# Patient Record
Sex: FEMALE | Race: WHITE | NOT HISPANIC OR LATINO | Employment: UNEMPLOYED | URBAN - METROPOLITAN AREA
[De-identification: names, ages, dates, MRNs, and addresses within clinical notes are randomized per-mention and may not be internally consistent; named-entity substitution may affect disease eponyms.]

---

## 2021-03-11 PROBLEM — O30.001 TWIN GESTATION IN FIRST TRIMESTER, UNSPECIFIED MULTIPLE GESTATION TYPE: Status: RESOLVED | Noted: 2021-03-11 | Resolved: 2021-03-11

## 2021-03-11 PROBLEM — O30.001: Status: ACTIVE | Noted: 2021-03-11

## 2021-03-11 PROBLEM — O30.001 TWIN GESTATION IN FIRST TRIMESTER, UNSPECIFIED MULTIPLE GESTATION TYPE: Status: ACTIVE | Noted: 2021-03-11

## 2021-03-11 PROBLEM — O30.032 MONOCHORIONIC DIAMNIOTIC TWIN GESTATION IN SECOND TRIMESTER (HCC): Status: ACTIVE | Noted: 2021-03-11

## 2021-03-11 PROBLEM — O30.032 MONOCHORIONIC DIAMNIOTIC TWIN GESTATION IN SECOND TRIMESTER: Status: ACTIVE | Noted: 2021-03-11

## 2021-03-11 PROBLEM — O30.001: Status: RESOLVED | Noted: 2021-03-11 | Resolved: 2021-03-11

## 2021-03-11 PROBLEM — O31.22X1: Status: ACTIVE | Noted: 2021-03-11

## 2021-04-12 ENCOUNTER — HOSPITAL ENCOUNTER (OUTPATIENT)
Dept: MRI IMAGING | Age: 31
Discharge: HOME | End: 2021-04-12
Attending: OBSTETRICS & GYNECOLOGY

## 2021-04-12 DIAGNOSIS — O31.22X1 TWIN PREGNANCY WITH SINGLE INTRAUTERINE DEATH IN SECOND TRIMESTER, FETUS 1 OF MULTIPLE GESTATION: ICD-10-CM

## 2021-04-12 PROCEDURE — 74713 MRI FETAL EA ADDL GESTATION: CPT

## 2021-04-12 PROCEDURE — 74712 MRI FETAL SNGL/1ST GESTATION: CPT

## 2021-07-30 ENCOUNTER — TELEPHONE (OUTPATIENT)
Dept: OBGYN UNIT | Facility: HOSPITAL | Age: 31
End: 2021-07-30

## 2021-08-03 ENCOUNTER — TELEPHONE (OUTPATIENT)
Dept: OBGYN UNIT | Facility: HOSPITAL | Age: 31
End: 2021-08-03

## 2021-08-05 ENCOUNTER — APPOINTMENT (OUTPATIENT)
Dept: OBGYN CLINIC | Facility: HOSPITAL | Age: 31
End: 2021-08-05
Payer: COMMERCIAL

## 2021-08-05 ENCOUNTER — HOSPITAL ENCOUNTER (INPATIENT)
Facility: HOSPITAL | Age: 31
LOS: 2 days | Discharge: HOME | End: 2021-08-07
Attending: OBSTETRICS & GYNECOLOGY | Admitting: OBSTETRICS & GYNECOLOGY
Payer: COMMERCIAL

## 2021-08-05 ENCOUNTER — ANESTHESIA (OUTPATIENT)
Dept: OBGYN UNIT | Facility: HOSPITAL | Age: 31
End: 2021-08-05
Payer: COMMERCIAL

## 2021-08-05 ENCOUNTER — ANESTHESIA EVENT (OUTPATIENT)
Dept: OBGYN UNIT | Facility: HOSPITAL | Age: 31
End: 2021-08-05
Payer: COMMERCIAL

## 2021-08-05 PROBLEM — Z34.90 PREGNANCY: Status: ACTIVE | Noted: 2021-08-05

## 2021-08-05 PROBLEM — Z34.90 PREGNANCY (HCC): Status: ACTIVE | Noted: 2021-08-05

## 2021-08-05 LAB
ANTIBODY SCREEN: NEGATIVE
BASOPHILS # BLD AUTO: 0.02 X10(3) UL (ref 0–0.2)
BASOPHILS NFR BLD AUTO: 0.5 %
EOSINOPHIL # BLD AUTO: 0.09 X10(3) UL (ref 0–0.7)
EOSINOPHIL NFR BLD AUTO: 2.1 %
ERYTHROCYTE [DISTWIDTH] IN BLOOD BY AUTOMATED COUNT: 17.1 %
HCT VFR BLD AUTO: 36 %
HGB BLD-MCNC: 11.8 G/DL
IMM GRANULOCYTES # BLD AUTO: 0.01 X10(3) UL (ref 0–1)
IMM GRANULOCYTES NFR BLD: 0.2 %
LYMPHOCYTES # BLD AUTO: 1.2 X10(3) UL (ref 1–4)
LYMPHOCYTES NFR BLD AUTO: 28.1 %
MCH RBC QN AUTO: 28.4 PG (ref 26–34)
MCHC RBC AUTO-ENTMCNC: 32.8 G/DL (ref 31–37)
MCV RBC AUTO: 86.7 FL
MONOCYTES # BLD AUTO: 0.46 X10(3) UL (ref 0.1–1)
MONOCYTES NFR BLD AUTO: 10.8 %
NEUTROPHILS # BLD AUTO: 2.49 X10 (3) UL (ref 1.5–7.7)
NEUTROPHILS # BLD AUTO: 2.49 X10(3) UL (ref 1.5–7.7)
NEUTROPHILS NFR BLD AUTO: 58.3 %
PLATELET # BLD AUTO: 181 10(3)UL (ref 150–450)
RBC # BLD AUTO: 4.15 X10(6)UL
RH BLOOD TYPE: POSITIVE
T PALLIDUM AB SER QL IA: NONREACTIVE
WBC # BLD AUTO: 4.3 X10(3) UL (ref 4–11)

## 2021-08-05 PROCEDURE — 3E033VJ INTRODUCTION OF OTHER HORMONE INTO PERIPHERAL VEIN, PERCUTANEOUS APPROACH: ICD-10-PCS | Performed by: OBSTETRICS & GYNECOLOGY

## 2021-08-05 PROCEDURE — 86850 RBC ANTIBODY SCREEN: CPT | Performed by: OBSTETRICS & GYNECOLOGY

## 2021-08-05 PROCEDURE — 86900 BLOOD TYPING SEROLOGIC ABO: CPT | Performed by: OBSTETRICS & GYNECOLOGY

## 2021-08-05 PROCEDURE — 85025 COMPLETE CBC W/AUTO DIFF WBC: CPT | Performed by: OBSTETRICS & GYNECOLOGY

## 2021-08-05 PROCEDURE — 10907ZC DRAINAGE OF AMNIOTIC FLUID, THERAPEUTIC FROM PRODUCTS OF CONCEPTION, VIA NATURAL OR ARTIFICIAL OPENING: ICD-10-PCS | Performed by: OBSTETRICS & GYNECOLOGY

## 2021-08-05 PROCEDURE — 86901 BLOOD TYPING SEROLOGIC RH(D): CPT | Performed by: OBSTETRICS & GYNECOLOGY

## 2021-08-05 PROCEDURE — 88309 TISSUE EXAM BY PATHOLOGIST: CPT | Performed by: OBSTETRICS & GYNECOLOGY

## 2021-08-05 PROCEDURE — 0HQ9XZZ REPAIR PERINEUM SKIN, EXTERNAL APPROACH: ICD-10-PCS | Performed by: OBSTETRICS & GYNECOLOGY

## 2021-08-05 PROCEDURE — 86780 TREPONEMA PALLIDUM: CPT | Performed by: OBSTETRICS & GYNECOLOGY

## 2021-08-05 PROCEDURE — 88307 TISSUE EXAM BY PATHOLOGIST: CPT | Performed by: OBSTETRICS & GYNECOLOGY

## 2021-08-05 RX ORDER — ACETAMINOPHEN 325 MG/1
650 TABLET ORAL EVERY 6 HOURS PRN
Status: DISCONTINUED | OUTPATIENT
Start: 2021-08-05 | End: 2021-08-07

## 2021-08-05 RX ORDER — DOCUSATE SODIUM 100 MG/1
100 CAPSULE, LIQUID FILLED ORAL 2 TIMES DAILY
Status: DISCONTINUED | OUTPATIENT
Start: 2021-08-05 | End: 2021-08-07

## 2021-08-05 RX ORDER — NALBUPHINE HCL 10 MG/ML
2.5 AMPUL (ML) INJECTION
Status: DISCONTINUED | OUTPATIENT
Start: 2021-08-05 | End: 2021-08-05

## 2021-08-05 RX ORDER — IBUPROFEN 600 MG/1
600 TABLET ORAL EVERY 6 HOURS
Status: DISCONTINUED | OUTPATIENT
Start: 2021-08-05 | End: 2021-08-07

## 2021-08-05 RX ORDER — TERBUTALINE SULFATE 1 MG/ML
0.25 INJECTION, SOLUTION SUBCUTANEOUS AS NEEDED
Status: DISCONTINUED | OUTPATIENT
Start: 2021-08-05 | End: 2021-08-05

## 2021-08-05 RX ORDER — BUPIVACAINE HYDROCHLORIDE 2.5 MG/ML
INJECTION, SOLUTION EPIDURAL; INFILTRATION; INTRACAUDAL AS NEEDED
Status: DISCONTINUED | OUTPATIENT
Start: 2021-08-05 | End: 2021-08-05 | Stop reason: SURG

## 2021-08-05 RX ORDER — SIMETHICONE 80 MG
80 TABLET,CHEWABLE ORAL 3 TIMES DAILY PRN
Status: DISCONTINUED | OUTPATIENT
Start: 2021-08-05 | End: 2021-08-07

## 2021-08-05 RX ORDER — TRISODIUM CITRATE DIHYDRATE AND CITRIC ACID MONOHYDRATE 500; 334 MG/5ML; MG/5ML
30 SOLUTION ORAL AS NEEDED
Status: DISCONTINUED | OUTPATIENT
Start: 2021-08-05 | End: 2021-08-05

## 2021-08-05 RX ORDER — BISACODYL 10 MG
10 SUPPOSITORY, RECTAL RECTAL ONCE AS NEEDED
Status: DISCONTINUED | OUTPATIENT
Start: 2021-08-05 | End: 2021-08-07

## 2021-08-05 RX ORDER — BUPIVACAINE HCL/0.9 % NACL/PF 0.25 %
5 PLASTIC BAG, INJECTION (ML) EPIDURAL AS NEEDED
Status: DISCONTINUED | OUTPATIENT
Start: 2021-08-05 | End: 2021-08-05

## 2021-08-05 RX ORDER — DEXTROSE, SODIUM CHLORIDE, SODIUM LACTATE, POTASSIUM CHLORIDE, AND CALCIUM CHLORIDE 5; .6; .31; .03; .02 G/100ML; G/100ML; G/100ML; G/100ML; G/100ML
INJECTION, SOLUTION INTRAVENOUS AS NEEDED
Status: DISCONTINUED | OUTPATIENT
Start: 2021-08-05 | End: 2021-08-05

## 2021-08-05 RX ORDER — IBUPROFEN 600 MG/1
600 TABLET ORAL EVERY 6 HOURS PRN
Status: DISCONTINUED | OUTPATIENT
Start: 2021-08-05 | End: 2021-08-05

## 2021-08-05 RX ORDER — ACETAMINOPHEN 500 MG
500 TABLET ORAL EVERY 6 HOURS PRN
Status: DISCONTINUED | OUTPATIENT
Start: 2021-08-05 | End: 2021-08-05

## 2021-08-05 RX ORDER — ONDANSETRON 2 MG/ML
4 INJECTION INTRAMUSCULAR; INTRAVENOUS EVERY 6 HOURS PRN
Status: DISCONTINUED | OUTPATIENT
Start: 2021-08-05 | End: 2021-08-05

## 2021-08-05 RX ORDER — AMMONIA INHALANTS 0.04 G/.3ML
0.3 INHALANT RESPIRATORY (INHALATION) AS NEEDED
Status: DISCONTINUED | OUTPATIENT
Start: 2021-08-05 | End: 2021-08-05

## 2021-08-05 RX ORDER — SODIUM CHLORIDE, SODIUM LACTATE, POTASSIUM CHLORIDE, CALCIUM CHLORIDE 600; 310; 30; 20 MG/100ML; MG/100ML; MG/100ML; MG/100ML
INJECTION, SOLUTION INTRAVENOUS CONTINUOUS
Status: DISCONTINUED | OUTPATIENT
Start: 2021-08-05 | End: 2021-08-05

## 2021-08-05 RX ADMIN — BUPIVACAINE HYDROCHLORIDE 6 ML: 2.5 INJECTION, SOLUTION EPIDURAL; INFILTRATION; INTRACAUDAL at 13:48:00

## 2021-08-05 NOTE — PROGRESS NOTES
Called to room for something in the bed. Uncovered patient, twin B laying in bed, no obvious signs of life. Appears cream colored, can make out 4 limbs, head and body. Patient and  both looked at baby. Desires baby to be cremated.      SVE: complete/

## 2021-08-05 NOTE — ANESTHESIA PREPROCEDURE EVALUATION
PRE-OP EVALUATION    Patient Name: Shon Austin    Admit Diagnosis: Pregnancy    Pre-op Diagnosis: * No pre-op diagnosis entered *        Anesthesia Procedure: LABOR ANALGESIA    * No surgeons found in log *    Pre-op vitals reviewed.   Temp: 97.8 °F (36 GI/Hepatic/Renal                                 Cardiovascular        Exercise tolerance: good     MET: >4                                           Endo/Other               (+) anemia                   Pulmonary                 (-) recent URI          Ne

## 2021-08-05 NOTE — PROGRESS NOTES
Pt is a 27year old female admitted to 114/114-A, Patient presents with:  Scheduled Induction     Pt is 37w4d intra-uterine pregnancy. Denies any leaking of fluid. Reports +fetal movement. History obtained, consents signed.  Oriented to room, staff, and p

## 2021-08-05 NOTE — PLAN OF CARE
Problem: BIRTH - VAGINAL/ SECTION  Goal: Fetal and maternal status remain reassuring during the birth process  Description: INTERVENTIONS:  - Monitor vital signs  - Monitor fetal heart rate  - Monitor uterine activity  - Monitor labor progression by Reddy Gaming, RN  Outcome: Completed  8/5/2021 0907 by Reddy Gaming, RN  Outcome: Progressing  Goal: Patient/Family Short Term Goal  Description: Patient's Short Term Goal: Adequate pain relief    Interventions:     - See additional Care P

## 2021-08-05 NOTE — L&D DELIVERY NOTE
Dane Lubin, Girl [[id number]]    Labor Events     labor?: No  Rupture date/time: 2021 1324     Rupture type: AROM  Fluid color: Clear  Intrapartum & labor complications comment: Twin B passed away at 15 wks     Labor Event Times    Labor onset date/ti Vaginal Count    No data filed      Delivery (Maternal)    No data filed           Delivery Summary    Patient was complete and pushing for 15 minutes. Head delivered spontaneously. She then delivered a viable baby girl via  without difficulty.  The

## 2021-08-05 NOTE — H&P
[address] [facility] Patient Status:  Inpatient    1990 MRN [id number]   Location [address] [facility] Attending Deann Carvajal, [address] Day # 0 PCP No primary care provider on file.      SUBJECTIVE: 12:15 PM

## 2021-08-05 NOTE — ANESTHESIA PROCEDURE NOTES
Labor Analgesia  Performed by: Torie Greco MD  Authorized by: Torie Greco MD       General Information and Staff    Start Time:  8/5/2021 1:39 PM  End Time:  8/5/2021 1:48 PM  Anesthesiologist:  Torie Greco MD  Performed by:   Anesthesiologist

## 2021-08-05 NOTE — PROGRESS NOTES
Report given to mother baby RN, Infant and pt transferred to mother baby in stable condition accompanied by RN and .

## 2021-08-05 NOTE — PROGRESS NOTES
Feeling contractions, not painful yet  FHT: cat 1  [place]: every 2-3 min    /56   Pulse 87   Temp 97.8 °F (36.6 °C) (Axillary)   Resp 14   LMP 11/15/2020   SVE: 4/70-2  Hand no longer palpated during cervical exam, head applied to cervix  AROM, clear f

## 2021-08-05 NOTE — PROGRESS NOTES
Met with patient and , Monet Bustos, shortly after delivery. Discussed mixed emotions of yo for surviving twin, Rhonda Barkley, and sadness for daughter Santino Jaime. Share resources reviewed, and follow up call for next week planned.

## 2021-08-06 PROBLEM — Z34.90 PREGNANCY (HCC): Status: RESOLVED | Noted: 2021-08-05 | Resolved: 2021-08-06

## 2021-08-06 PROBLEM — Z34.90 PREGNANCY: Status: RESOLVED | Noted: 2021-08-05 | Resolved: 2021-08-06

## 2021-08-06 LAB
BASOPHILS # BLD AUTO: 0.03 X10(3) UL (ref 0–0.2)
BASOPHILS NFR BLD AUTO: 0.5 %
EOSINOPHIL # BLD AUTO: 0.11 X10(3) UL (ref 0–0.7)
EOSINOPHIL NFR BLD AUTO: 1.9 %
ERYTHROCYTE [DISTWIDTH] IN BLOOD BY AUTOMATED COUNT: 17.2 %
HCT VFR BLD AUTO: 34.8 %
HGB BLD-MCNC: 11.4 G/DL
IMM GRANULOCYTES # BLD AUTO: 0.03 X10(3) UL (ref 0–1)
IMM GRANULOCYTES NFR BLD: 0.5 %
LYMPHOCYTES # BLD AUTO: 1.16 X10(3) UL (ref 1–4)
LYMPHOCYTES NFR BLD AUTO: 19.8 %
MCH RBC QN AUTO: 28.1 PG (ref 26–34)
MCHC RBC AUTO-ENTMCNC: 32.8 G/DL (ref 31–37)
MCV RBC AUTO: 85.7 FL
MONOCYTES # BLD AUTO: 0.58 X10(3) UL (ref 0.1–1)
MONOCYTES NFR BLD AUTO: 9.9 %
NEUTROPHILS # BLD AUTO: 3.94 X10 (3) UL (ref 1.5–7.7)
NEUTROPHILS # BLD AUTO: 3.94 X10(3) UL (ref 1.5–7.7)
NEUTROPHILS NFR BLD AUTO: 67.4 %
PLATELET # BLD AUTO: 170 10(3)UL (ref 150–450)
RBC # BLD AUTO: 4.06 X10(6)UL
WBC # BLD AUTO: 5.9 X10(3) UL (ref 4–11)

## 2021-08-06 PROCEDURE — 85025 COMPLETE CBC W/AUTO DIFF WBC: CPT | Performed by: OBSTETRICS & GYNECOLOGY

## 2021-08-06 RX ORDER — IBUPROFEN 600 MG/1
600 TABLET ORAL EVERY 6 HOURS PRN
Qty: 60 TABLET | Refills: 0 | Status: SHIPPED | OUTPATIENT
Start: 2021-08-06 | End: 2021-10-12

## 2021-08-06 NOTE — PROGRESS NOTES
Labor Analgesia Follow Up Note    Patient underwent epidural anesthesia for labor analgesia,    Placenta Date/Time: 8/5/2021  4:06 PM    Delivery Date/Time[de-identified] 8/5/2021  4:04 PM    /61 (BP Location: Left arm)   Pulse 73   Temp 98.1 °F (36.7 °C) (Axilla

## 2021-08-07 VITALS
HEART RATE: 67 BPM | RESPIRATION RATE: 18 BRPM | TEMPERATURE: 98 F | DIASTOLIC BLOOD PRESSURE: 76 MMHG | SYSTOLIC BLOOD PRESSURE: 120 MMHG

## 2021-08-07 NOTE — PROGRESS NOTES
OB Progress Note PPD#2  S: Feels well. Ambulating, eating. Pain controlled. Minimal VB. Breastfeeding. O:   Blood pressure 120/76, pulse 67, temperature 97.5 °F (36.4 °C), temperature source Oral, resp.  rate 18, last menstrual period 11/15/2020, current

## 2021-08-09 ENCOUNTER — TELEPHONE (OUTPATIENT)
Dept: OBGYN UNIT | Facility: HOSPITAL | Age: 31
End: 2021-08-09

## 2021-08-09 NOTE — PROGRESS NOTES
Reviewed self and infant care w / mom, she verbalizes understanding of instructions reviewed. Encourage to follow up w/ MDs as directed and w/ questions/concerns. Given phone number of [facility] to discuss other twins pickup by  home, denies ppd or bb.

## 2021-08-12 ENCOUNTER — TELEPHONE (OUTPATIENT)
Dept: OBGYN UNIT | Facility: HOSPITAL | Age: 31
End: 2021-08-12

## 2022-10-25 ENCOUNTER — OFFICE VISIT (OUTPATIENT)
Dept: FAMILY MEDICINE CLINIC | Facility: CLINIC | Age: 32
End: 2022-10-25
Payer: COMMERCIAL

## 2022-10-25 VITALS
BODY MASS INDEX: 19.53 KG/M2 | TEMPERATURE: 98 F | HEART RATE: 72 BPM | DIASTOLIC BLOOD PRESSURE: 64 MMHG | SYSTOLIC BLOOD PRESSURE: 102 MMHG | WEIGHT: 127.38 LBS | HEIGHT: 67.75 IN | RESPIRATION RATE: 16 BRPM

## 2022-10-25 DIAGNOSIS — Z00.00 WELL WOMAN EXAM WITHOUT GYNECOLOGICAL EXAM: Primary | ICD-10-CM

## 2022-10-25 PROBLEM — O31.22X1: Status: RESOLVED | Noted: 2021-03-11 | Resolved: 2022-10-25

## 2022-10-25 PROBLEM — O30.032 MONOCHORIONIC DIAMNIOTIC TWIN GESTATION IN SECOND TRIMESTER: Status: RESOLVED | Noted: 2021-03-11 | Resolved: 2022-10-25

## 2022-10-25 PROBLEM — O30.032 MONOCHORIONIC DIAMNIOTIC TWIN GESTATION IN SECOND TRIMESTER (HCC): Status: RESOLVED | Noted: 2021-03-11 | Resolved: 2022-10-25

## 2022-10-25 PROCEDURE — 99385 PREV VISIT NEW AGE 18-39: CPT | Performed by: FAMILY MEDICINE

## 2022-10-25 PROCEDURE — 3078F DIAST BP <80 MM HG: CPT | Performed by: FAMILY MEDICINE

## 2022-10-25 PROCEDURE — 3008F BODY MASS INDEX DOCD: CPT | Performed by: FAMILY MEDICINE

## 2022-10-25 PROCEDURE — 3074F SYST BP LT 130 MM HG: CPT | Performed by: FAMILY MEDICINE

## 2022-10-27 ENCOUNTER — LAB ENCOUNTER (OUTPATIENT)
Dept: LAB | Facility: HOSPITAL | Age: 32
End: 2022-10-27
Attending: FAMILY MEDICINE
Payer: COMMERCIAL

## 2022-10-27 DIAGNOSIS — Z00.00 WELL WOMAN EXAM WITHOUT GYNECOLOGICAL EXAM: ICD-10-CM

## 2022-10-27 LAB
ALBUMIN SERPL-MCNC: 4.2 G/DL (ref 3.4–5)
ALBUMIN/GLOB SERPL: 1.1 {RATIO} (ref 1–2)
ALP LIVER SERPL-CCNC: 44 U/L
ALT SERPL-CCNC: 20 U/L
ANION GAP SERPL CALC-SCNC: 4 MMOL/L (ref 0–18)
AST SERPL-CCNC: 12 U/L (ref 15–37)
BASOPHILS # BLD AUTO: 0.03 X10(3) UL (ref 0–0.2)
BASOPHILS NFR BLD AUTO: 0.8 %
BILIRUB SERPL-MCNC: 1 MG/DL (ref 0.1–2)
BUN BLD-MCNC: 14 MG/DL (ref 7–18)
CALCIUM BLD-MCNC: 8.6 MG/DL (ref 8.5–10.1)
CHLORIDE SERPL-SCNC: 107 MMOL/L (ref 98–112)
CHOLEST SERPL-MCNC: 203 MG/DL (ref ?–200)
CO2 SERPL-SCNC: 25 MMOL/L (ref 21–32)
CREAT BLD-MCNC: 0.67 MG/DL
EOSINOPHIL # BLD AUTO: 0.06 X10(3) UL (ref 0–0.7)
EOSINOPHIL NFR BLD AUTO: 1.6 %
ERYTHROCYTE [DISTWIDTH] IN BLOOD BY AUTOMATED COUNT: 13.2 %
FASTING PATIENT LIPID ANSWER: YES
FASTING STATUS PATIENT QL REPORTED: YES
GFR SERPLBLD BASED ON 1.73 SQ M-ARVRAT: 118 ML/MIN/1.73M2 (ref 60–?)
GLOBULIN PLAS-MCNC: 3.7 G/DL (ref 2.8–4.4)
GLUCOSE BLD-MCNC: 85 MG/DL (ref 70–99)
HCT VFR BLD AUTO: 40.7 %
HDLC SERPL-MCNC: 79 MG/DL (ref 40–59)
HGB BLD-MCNC: 13.4 G/DL
IMM GRANULOCYTES # BLD AUTO: 0.01 X10(3) UL (ref 0–1)
IMM GRANULOCYTES NFR BLD: 0.3 %
LDLC SERPL CALC-MCNC: 114 MG/DL (ref ?–100)
LYMPHOCYTES # BLD AUTO: 1.23 X10(3) UL (ref 1–4)
LYMPHOCYTES NFR BLD AUTO: 32.5 %
MCH RBC QN AUTO: 29.1 PG (ref 26–34)
MCHC RBC AUTO-ENTMCNC: 32.9 G/DL (ref 31–37)
MCV RBC AUTO: 88.5 FL
MONOCYTES # BLD AUTO: 0.39 X10(3) UL (ref 0.1–1)
MONOCYTES NFR BLD AUTO: 10.3 %
NEUTROPHILS # BLD AUTO: 2.07 X10 (3) UL (ref 1.5–7.7)
NEUTROPHILS # BLD AUTO: 2.07 X10(3) UL (ref 1.5–7.7)
NEUTROPHILS NFR BLD AUTO: 54.5 %
NONHDLC SERPL-MCNC: 124 MG/DL (ref ?–130)
OSMOLALITY SERPL CALC.SUM OF ELEC: 282 MOSM/KG (ref 275–295)
PLATELET # BLD AUTO: 242 10(3)UL (ref 150–450)
POTASSIUM SERPL-SCNC: 4.3 MMOL/L (ref 3.5–5.1)
PROT SERPL-MCNC: 7.9 G/DL (ref 6.4–8.2)
RBC # BLD AUTO: 4.6 X10(6)UL
SODIUM SERPL-SCNC: 136 MMOL/L (ref 136–145)
TRIGL SERPL-MCNC: 56 MG/DL (ref 30–149)
TSI SER-ACNC: 3.07 MIU/ML (ref 0.36–3.74)
VLDLC SERPL CALC-MCNC: 10 MG/DL (ref 0–30)
WBC # BLD AUTO: 3.8 X10(3) UL (ref 4–11)

## 2022-10-27 PROCEDURE — 84443 ASSAY THYROID STIM HORMONE: CPT

## 2022-10-27 PROCEDURE — 36415 COLL VENOUS BLD VENIPUNCTURE: CPT

## 2022-10-27 PROCEDURE — 80061 LIPID PANEL: CPT

## 2022-10-27 PROCEDURE — 80053 COMPREHEN METABOLIC PANEL: CPT

## 2022-10-27 PROCEDURE — 85025 COMPLETE CBC W/AUTO DIFF WBC: CPT

## 2022-10-28 ENCOUNTER — PATIENT MESSAGE (OUTPATIENT)
Dept: FAMILY MEDICINE CLINIC | Facility: CLINIC | Age: 32
End: 2022-10-28

## 2022-10-28 DIAGNOSIS — E78.5 HYPERLIPIDEMIA, UNSPECIFIED HYPERLIPIDEMIA TYPE: ICD-10-CM

## 2022-10-28 DIAGNOSIS — D72.819 LEUKOPENIA, UNSPECIFIED TYPE: Primary | ICD-10-CM

## 2022-12-06 ENCOUNTER — LAB ENCOUNTER (OUTPATIENT)
Dept: LAB | Facility: HOSPITAL | Age: 32
End: 2022-12-06
Attending: FAMILY MEDICINE
Payer: COMMERCIAL

## 2022-12-06 ENCOUNTER — PATIENT MESSAGE (OUTPATIENT)
Dept: FAMILY MEDICINE CLINIC | Facility: CLINIC | Age: 32
End: 2022-12-06

## 2022-12-06 DIAGNOSIS — E78.5 HYPERLIPIDEMIA, UNSPECIFIED HYPERLIPIDEMIA TYPE: ICD-10-CM

## 2022-12-06 DIAGNOSIS — D72.819 LEUKOPENIA, UNSPECIFIED TYPE: ICD-10-CM

## 2022-12-06 LAB
BASOPHILS # BLD AUTO: 0.04 X10(3) UL (ref 0–0.2)
BASOPHILS NFR BLD AUTO: 1 %
CHOLEST SERPL-MCNC: 185 MG/DL (ref ?–200)
EOSINOPHIL # BLD AUTO: 0.12 X10(3) UL (ref 0–0.7)
EOSINOPHIL NFR BLD AUTO: 3.1 %
ERYTHROCYTE [DISTWIDTH] IN BLOOD BY AUTOMATED COUNT: 13.2 %
FASTING PATIENT LIPID ANSWER: YES
HCT VFR BLD AUTO: 40.6 %
HDLC SERPL-MCNC: 75 MG/DL (ref 40–59)
HGB BLD-MCNC: 13 G/DL
IMM GRANULOCYTES # BLD AUTO: 0.01 X10(3) UL (ref 0–1)
IMM GRANULOCYTES NFR BLD: 0.3 %
LDLC SERPL CALC-MCNC: 99 MG/DL (ref ?–100)
LYMPHOCYTES # BLD AUTO: 1.94 X10(3) UL (ref 1–4)
LYMPHOCYTES NFR BLD AUTO: 50.5 %
MCH RBC QN AUTO: 28.4 PG (ref 26–34)
MCHC RBC AUTO-ENTMCNC: 32 G/DL (ref 31–37)
MCV RBC AUTO: 88.8 FL
MONOCYTES # BLD AUTO: 0.4 X10(3) UL (ref 0.1–1)
MONOCYTES NFR BLD AUTO: 10.4 %
NEUTROPHILS # BLD AUTO: 1.33 X10 (3) UL (ref 1.5–7.7)
NEUTROPHILS # BLD AUTO: 1.33 X10(3) UL (ref 1.5–7.7)
NEUTROPHILS NFR BLD AUTO: 34.7 %
NONHDLC SERPL-MCNC: 110 MG/DL (ref ?–130)
PLATELET # BLD AUTO: 283 10(3)UL (ref 150–450)
RBC # BLD AUTO: 4.57 X10(6)UL
TRIGL SERPL-MCNC: 59 MG/DL (ref 30–149)
VLDLC SERPL CALC-MCNC: 10 MG/DL (ref 0–30)
WBC # BLD AUTO: 3.8 X10(3) UL (ref 4–11)

## 2022-12-06 PROCEDURE — 85025 COMPLETE CBC W/AUTO DIFF WBC: CPT

## 2022-12-06 PROCEDURE — 36415 COLL VENOUS BLD VENIPUNCTURE: CPT

## 2022-12-06 PROCEDURE — 80061 LIPID PANEL: CPT

## 2022-12-09 ENCOUNTER — PATIENT MESSAGE (OUTPATIENT)
Dept: FAMILY MEDICINE CLINIC | Facility: CLINIC | Age: 32
End: 2022-12-09

## 2023-01-12 ENCOUNTER — LAB ENCOUNTER (OUTPATIENT)
Dept: LAB | Facility: HOSPITAL | Age: 33
End: 2023-01-12
Attending: FAMILY MEDICINE
Payer: COMMERCIAL

## 2023-01-12 DIAGNOSIS — D72.819 LEUKOPENIA, UNSPECIFIED TYPE: ICD-10-CM

## 2023-01-12 LAB
BASOPHILS # BLD AUTO: 0.03 X10(3) UL (ref 0–0.2)
BASOPHILS NFR BLD AUTO: 0.6 %
EOSINOPHIL # BLD AUTO: 0.04 X10(3) UL (ref 0–0.7)
EOSINOPHIL NFR BLD AUTO: 0.8 %
ERYTHROCYTE [DISTWIDTH] IN BLOOD BY AUTOMATED COUNT: 13.2 %
HCT VFR BLD AUTO: 42.8 %
HGB BLD-MCNC: 13.6 G/DL
IMM GRANULOCYTES # BLD AUTO: 0.02 X10(3) UL (ref 0–1)
IMM GRANULOCYTES NFR BLD: 0.4 %
LYMPHOCYTES # BLD AUTO: 1.12 X10(3) UL (ref 1–4)
LYMPHOCYTES NFR BLD AUTO: 22.3 %
MCH RBC QN AUTO: 27.8 PG (ref 26–34)
MCHC RBC AUTO-ENTMCNC: 31.8 G/DL (ref 31–37)
MCV RBC AUTO: 87.3 FL
MONOCYTES # BLD AUTO: 0.32 X10(3) UL (ref 0.1–1)
MONOCYTES NFR BLD AUTO: 6.4 %
NEUTROPHILS # BLD AUTO: 3.5 X10 (3) UL (ref 1.5–7.7)
NEUTROPHILS # BLD AUTO: 3.5 X10(3) UL (ref 1.5–7.7)
NEUTROPHILS NFR BLD AUTO: 69.5 %
PLATELET # BLD AUTO: 257 10(3)UL (ref 150–450)
RBC # BLD AUTO: 4.9 X10(6)UL
WBC # BLD AUTO: 5 X10(3) UL (ref 4–11)

## 2023-01-12 PROCEDURE — 36415 COLL VENOUS BLD VENIPUNCTURE: CPT

## 2023-01-12 PROCEDURE — 85025 COMPLETE CBC W/AUTO DIFF WBC: CPT

## 2023-02-11 NOTE — PROGRESS NOTES
NURSING ADMISSION NOTE    Patient admitted to postpartum unit in stable condition. Safety precautions initiated. Bed in low position. Call light in reach. Patient and family oriented to room and mother baby unit. Received pt from triage. Pt here with c/o Lt shoulder pain since 0130. Pt states she has a h/o gout in that shoulder and pain feels the same. Recent knee surgery on lt and h/o ablation for Afib. Denies Cp or SOB at this time. Pain with movement and palpation of shoulder. Denies trauma.  Dr. Luis Ambrose at  for eval.

## 2023-02-16 ENCOUNTER — OFFICE VISIT (OUTPATIENT)
Dept: FAMILY MEDICINE CLINIC | Facility: CLINIC | Age: 33
End: 2023-02-16
Payer: COMMERCIAL

## 2023-02-16 VITALS
SYSTOLIC BLOOD PRESSURE: 118 MMHG | OXYGEN SATURATION: 98 % | DIASTOLIC BLOOD PRESSURE: 70 MMHG | WEIGHT: 125 LBS | RESPIRATION RATE: 16 BRPM | TEMPERATURE: 97 F | HEART RATE: 88 BPM | HEIGHT: 69 IN | BODY MASS INDEX: 18.51 KG/M2

## 2023-02-16 DIAGNOSIS — R10.13 EPIGASTRIC PAIN: Primary | ICD-10-CM

## 2023-02-16 PROCEDURE — 3074F SYST BP LT 130 MM HG: CPT | Performed by: FAMILY MEDICINE

## 2023-02-16 PROCEDURE — 3078F DIAST BP <80 MM HG: CPT | Performed by: FAMILY MEDICINE

## 2023-02-16 PROCEDURE — 3008F BODY MASS INDEX DOCD: CPT | Performed by: FAMILY MEDICINE

## 2023-02-16 PROCEDURE — 99214 OFFICE O/P EST MOD 30 MIN: CPT | Performed by: FAMILY MEDICINE

## 2023-02-16 RX ORDER — SUCRALFATE 1 G/1
1 TABLET ORAL
Qty: 40 TABLET | Refills: 0 | Status: SHIPPED | OUTPATIENT
Start: 2023-02-16 | End: 2023-02-26

## 2023-02-16 RX ORDER — MULTIVIT-MIN/IRON/FOLIC ACID/K 18-600-40
CAPSULE ORAL
COMMUNITY

## 2023-02-16 RX ORDER — OMEPRAZOLE 20 MG/1
20 CAPSULE, DELAYED RELEASE ORAL
Qty: 60 CAPSULE | Refills: 0 | Status: SHIPPED | OUTPATIENT
Start: 2023-02-16

## 2023-02-17 ENCOUNTER — LAB ENCOUNTER (OUTPATIENT)
Dept: LAB | Facility: HOSPITAL | Age: 33
End: 2023-02-17
Attending: FAMILY MEDICINE
Payer: COMMERCIAL

## 2023-02-17 DIAGNOSIS — R10.13 EPIGASTRIC PAIN: ICD-10-CM

## 2023-02-17 LAB
ALBUMIN SERPL-MCNC: 3.8 G/DL (ref 3.4–5)
ALBUMIN/GLOB SERPL: 1.1 {RATIO} (ref 1–2)
ALP LIVER SERPL-CCNC: 49 U/L
ALT SERPL-CCNC: 20 U/L
ANION GAP SERPL CALC-SCNC: 3 MMOL/L (ref 0–18)
AST SERPL-CCNC: 15 U/L (ref 15–37)
BASOPHILS # BLD AUTO: 0.04 X10(3) UL (ref 0–0.2)
BASOPHILS NFR BLD AUTO: 1.1 %
BILIRUB SERPL-MCNC: 0.4 MG/DL (ref 0.1–2)
BUN BLD-MCNC: 11 MG/DL (ref 7–18)
CALCIUM BLD-MCNC: 9 MG/DL (ref 8.5–10.1)
CHLORIDE SERPL-SCNC: 107 MMOL/L (ref 98–112)
CO2 SERPL-SCNC: 28 MMOL/L (ref 21–32)
CREAT BLD-MCNC: 0.51 MG/DL
EOSINOPHIL # BLD AUTO: 0.22 X10(3) UL (ref 0–0.7)
EOSINOPHIL NFR BLD AUTO: 5.9 %
ERYTHROCYTE [DISTWIDTH] IN BLOOD BY AUTOMATED COUNT: 13.6 %
FASTING STATUS PATIENT QL REPORTED: NO
GFR SERPLBLD BASED ON 1.73 SQ M-ARVRAT: 126 ML/MIN/1.73M2 (ref 60–?)
GLOBULIN PLAS-MCNC: 3.4 G/DL (ref 2.8–4.4)
GLUCOSE BLD-MCNC: 89 MG/DL (ref 70–99)
HCT VFR BLD AUTO: 39 %
HGB BLD-MCNC: 12.5 G/DL
IMM GRANULOCYTES # BLD AUTO: 0.01 X10(3) UL (ref 0–1)
IMM GRANULOCYTES NFR BLD: 0.3 %
LIPASE SERPL-CCNC: 139 U/L (ref 73–393)
LIPASE SERPL-CCNC: 35 U/L (ref 13–75)
LYMPHOCYTES # BLD AUTO: 1.3 X10(3) UL (ref 1–4)
LYMPHOCYTES NFR BLD AUTO: 34.8 %
MCH RBC QN AUTO: 28.6 PG (ref 26–34)
MCHC RBC AUTO-ENTMCNC: 32.1 G/DL (ref 31–37)
MCV RBC AUTO: 89.2 FL
MONOCYTES # BLD AUTO: 0.48 X10(3) UL (ref 0.1–1)
MONOCYTES NFR BLD AUTO: 12.8 %
NEUTROPHILS # BLD AUTO: 1.69 X10 (3) UL (ref 1.5–7.7)
NEUTROPHILS # BLD AUTO: 1.69 X10(3) UL (ref 1.5–7.7)
NEUTROPHILS NFR BLD AUTO: 45.1 %
OSMOLALITY SERPL CALC.SUM OF ELEC: 285 MOSM/KG (ref 275–295)
PLATELET # BLD AUTO: 208 10(3)UL (ref 150–450)
POTASSIUM SERPL-SCNC: 4.3 MMOL/L (ref 3.5–5.1)
PROT SERPL-MCNC: 7.2 G/DL (ref 6.4–8.2)
RBC # BLD AUTO: 4.37 X10(6)UL
SODIUM SERPL-SCNC: 138 MMOL/L (ref 136–145)
WBC # BLD AUTO: 3.7 X10(3) UL (ref 4–11)

## 2023-02-17 PROCEDURE — 83690 ASSAY OF LIPASE: CPT

## 2023-02-17 PROCEDURE — 85025 COMPLETE CBC W/AUTO DIFF WBC: CPT

## 2023-02-17 PROCEDURE — 80053 COMPREHEN METABOLIC PANEL: CPT

## 2023-02-17 PROCEDURE — 87338 HPYLORI STOOL AG IA: CPT

## 2023-02-17 PROCEDURE — 36415 COLL VENOUS BLD VENIPUNCTURE: CPT

## 2023-02-21 LAB — H PYLORI AG STL QL IA: NEGATIVE

## 2024-01-18 ENCOUNTER — TELEPHONE (OUTPATIENT)
Dept: FAMILY MEDICINE CLINIC | Facility: CLINIC | Age: 34
End: 2024-01-18

## 2024-01-18 DIAGNOSIS — Z13.29 SCREENING FOR THYROID DISORDER: ICD-10-CM

## 2024-01-18 DIAGNOSIS — Z13.6 SCREENING FOR CARDIOVASCULAR CONDITION: ICD-10-CM

## 2024-01-18 DIAGNOSIS — Z00.00 LABORATORY EXAMINATION ORDERED AS PART OF A ROUTINE GENERAL MEDICAL EXAMINATION: ICD-10-CM

## 2024-01-18 DIAGNOSIS — Z13.0 SCREENING FOR IRON DEFICIENCY ANEMIA: ICD-10-CM

## 2024-01-18 DIAGNOSIS — Z13.1 SCREENING FOR DIABETES MELLITUS: Primary | ICD-10-CM

## 2024-01-18 NOTE — TELEPHONE ENCOUNTER
Please enter lab orders for the patient's upcoming physical appointment.     Physical scheduled:   Your appointments       Date & Time Appointment Department ([place])    Apr 08, 2024  9:00 AM CDT Adult Physical with Kay Padilla DO [facility], [facility] ([facility])    PLEASE NOTE - Most insurances allow a Complete Physical once every 366 days. Please schedule accordingly.    Please arrive 15 minutes prior to your scheduled appointment. Please also bring your Insurance card, Photo ID, and your medication bottles or a list of your current medication.    If you no longer require this appointment, please contact your physician office to cancel.              [facility], [facility] [address]  [address] [place] [id number]  [phone]           Preferred lab: [facility] ([facility])     The patient has been notified to complete fasting labs prior to their physical appointment.

## 2024-04-03 ENCOUNTER — LAB ENCOUNTER (OUTPATIENT)
Dept: LAB | Facility: HOSPITAL | Age: 34
End: 2024-04-03
Attending: FAMILY MEDICINE
Payer: COMMERCIAL

## 2024-04-03 DIAGNOSIS — Z13.6 SCREENING FOR CARDIOVASCULAR CONDITION: ICD-10-CM

## 2024-04-03 DIAGNOSIS — Z00.00 LABORATORY EXAMINATION ORDERED AS PART OF A ROUTINE GENERAL MEDICAL EXAMINATION: ICD-10-CM

## 2024-04-03 DIAGNOSIS — Z13.29 SCREENING FOR THYROID DISORDER: ICD-10-CM

## 2024-04-03 DIAGNOSIS — Z13.0 SCREENING FOR IRON DEFICIENCY ANEMIA: ICD-10-CM

## 2024-04-03 DIAGNOSIS — Z13.1 SCREENING FOR DIABETES MELLITUS: ICD-10-CM

## 2024-04-03 LAB
ALBUMIN SERPL-MCNC: 3.8 G/DL (ref 3.4–5)
ALBUMIN/GLOB SERPL: 1 {RATIO} (ref 1–2)
ALP LIVER SERPL-CCNC: 62 U/L
ALT SERPL-CCNC: 37 U/L
ANION GAP SERPL CALC-SCNC: 2 MMOL/L (ref 0–18)
AST SERPL-CCNC: 28 U/L (ref 15–37)
BASOPHILS # BLD AUTO: 0.05 X10(3) UL (ref 0–0.2)
BASOPHILS NFR BLD AUTO: 1.1 %
BILIRUB SERPL-MCNC: 0.9 MG/DL (ref 0.1–2)
BUN BLD-MCNC: 20 MG/DL (ref 9–23)
CALCIUM BLD-MCNC: 8.8 MG/DL (ref 8.5–10.1)
CHLORIDE SERPL-SCNC: 109 MMOL/L (ref 98–112)
CHOLEST SERPL-MCNC: 202 MG/DL (ref ?–200)
CO2 SERPL-SCNC: 28 MMOL/L (ref 21–32)
CREAT BLD-MCNC: 0.72 MG/DL
EGFRCR SERPLBLD CKD-EPI 2021: 112 ML/MIN/1.73M2 (ref 60–?)
EOSINOPHIL # BLD AUTO: 0.08 X10(3) UL (ref 0–0.7)
EOSINOPHIL NFR BLD AUTO: 1.7 %
ERYTHROCYTE [DISTWIDTH] IN BLOOD BY AUTOMATED COUNT: 13.9 %
FASTING PATIENT LIPID ANSWER: YES
FASTING STATUS PATIENT QL REPORTED: YES
GLOBULIN PLAS-MCNC: 3.7 G/DL (ref 2.8–4.4)
GLUCOSE BLD-MCNC: 88 MG/DL (ref 70–99)
HCT VFR BLD AUTO: 40.2 %
HDLC SERPL-MCNC: 83 MG/DL (ref 40–59)
HGB BLD-MCNC: 13.4 G/DL
IMM GRANULOCYTES # BLD AUTO: 0 X10(3) UL (ref 0–1)
IMM GRANULOCYTES NFR BLD: 0 %
LDLC SERPL CALC-MCNC: 112 MG/DL (ref ?–100)
LYMPHOCYTES # BLD AUTO: 1.37 X10(3) UL (ref 1–4)
LYMPHOCYTES NFR BLD AUTO: 29.6 %
MCH RBC QN AUTO: 28.7 PG (ref 26–34)
MCHC RBC AUTO-ENTMCNC: 33.3 G/DL (ref 31–37)
MCV RBC AUTO: 86.1 FL
MONOCYTES # BLD AUTO: 0.42 X10(3) UL (ref 0.1–1)
MONOCYTES NFR BLD AUTO: 9.1 %
NEUTROPHILS # BLD AUTO: 2.71 X10 (3) UL (ref 1.5–7.7)
NEUTROPHILS # BLD AUTO: 2.71 X10(3) UL (ref 1.5–7.7)
NEUTROPHILS NFR BLD AUTO: 58.5 %
NONHDLC SERPL-MCNC: 119 MG/DL (ref ?–130)
OSMOLALITY SERPL CALC.SUM OF ELEC: 290 MOSM/KG (ref 275–295)
PLATELET # BLD AUTO: 239 10(3)UL (ref 150–450)
POTASSIUM SERPL-SCNC: 4.3 MMOL/L (ref 3.5–5.1)
PROT SERPL-MCNC: 7.5 G/DL (ref 6.4–8.2)
RBC # BLD AUTO: 4.67 X10(6)UL
SODIUM SERPL-SCNC: 139 MMOL/L (ref 136–145)
TRIGL SERPL-MCNC: 36 MG/DL (ref 30–149)
TSI SER-ACNC: 2.84 MIU/ML (ref 0.36–3.74)
VLDLC SERPL CALC-MCNC: 6 MG/DL (ref 0–30)
WBC # BLD AUTO: 4.6 X10(3) UL (ref 4–11)

## 2024-04-03 PROCEDURE — 85025 COMPLETE CBC W/AUTO DIFF WBC: CPT

## 2024-04-03 PROCEDURE — 84443 ASSAY THYROID STIM HORMONE: CPT

## 2024-04-03 PROCEDURE — 80053 COMPREHEN METABOLIC PANEL: CPT

## 2024-04-03 PROCEDURE — 80061 LIPID PANEL: CPT

## 2024-04-03 PROCEDURE — 36415 COLL VENOUS BLD VENIPUNCTURE: CPT

## 2024-04-08 ENCOUNTER — OFFICE VISIT (OUTPATIENT)
Dept: FAMILY MEDICINE CLINIC | Facility: CLINIC | Age: 34
End: 2024-04-08
Payer: COMMERCIAL

## 2024-04-08 VITALS
HEART RATE: 78 BPM | HEIGHT: 68 IN | TEMPERATURE: 98 F | RESPIRATION RATE: 18 BRPM | BODY MASS INDEX: 19.7 KG/M2 | DIASTOLIC BLOOD PRESSURE: 80 MMHG | SYSTOLIC BLOOD PRESSURE: 108 MMHG | WEIGHT: 130 LBS | OXYGEN SATURATION: 100 %

## 2024-04-08 DIAGNOSIS — E78.2 MODERATE MIXED HYPERLIPIDEMIA NOT REQUIRING STATIN THERAPY: ICD-10-CM

## 2024-04-08 DIAGNOSIS — Z00.00 ROUTINE HEALTH MAINTENANCE: Primary | ICD-10-CM

## 2024-04-08 DIAGNOSIS — Z81.8 FAMILY HISTORY OF FIRST DEGREE RELATIVE WITH DEMENTIA: ICD-10-CM

## 2024-04-08 PROCEDURE — 3074F SYST BP LT 130 MM HG: CPT | Performed by: FAMILY MEDICINE

## 2024-04-08 PROCEDURE — 3008F BODY MASS INDEX DOCD: CPT | Performed by: FAMILY MEDICINE

## 2024-04-08 PROCEDURE — 99395 PREV VISIT EST AGE 18-39: CPT | Performed by: FAMILY MEDICINE

## 2024-04-08 PROCEDURE — 3079F DIAST BP 80-89 MM HG: CPT | Performed by: FAMILY MEDICINE

## 2024-04-08 NOTE — PROGRESS NOTES
SUBJECTIVE:  Chief Complaint   Patient presents with    Physical     HPI:  Would like to look into APO-B and calcium score.  There is family history of heart disease in both parents have dementia (father is since passed away) with mom having early onset in her 50s.    Health Maintenance:  Vaccines: reviewed as below. Indicated today: none  Immunization History   Administered Date(s) Administered    Covid-19 Vaccine Pfizer 30 mcg/0.3 ml 05/14/2021, 06/04/2021    FLU VAC QIV SPLIT 3 YRS AND OLDER (11235) 10/07/2022    TDAP 06/22/2021     Obesity screening: Body mass index is 19.77 kg/m².  Diabetes screening:  negative  Hypercholesterolemia screening:   Lab Results   Component Value Date    HDL 83 (H) 04/03/2024    HDL 75 (H) 12/06/2022    HDL 79 (H) 10/27/2022     Lab Results   Component Value Date     (H) 04/03/2024    LDL 99 12/06/2022     (H) 10/27/2022     Lab Results   Component Value Date    TRIG 36 04/03/2024    TRIG 59 12/06/2022    TRIG 56 10/27/2022        Depression screen: no concern    Osteoporosis: No history of pathologic fractures. No steroid use, smoking, risk of falls, excessive alcohol use.  Cervical Cancer screening: Last Pap: Sees gyne, 2021  Colon Cancer screening: Family history of colon cancer? no   Breast Cancer screening: Family history of breast cancer? no  STI: Desires testing for STIs? no  Tobacco use:  reports that she has never smoked. She has never used smokeless tobacco.    ROS: Negative unless stated above    HISTORY:  Past Medical History:    Anemia    Monochorionic diamniotic twin gestation in second trimester (HCC)    Twin pregnancy with single intrauterine death, second trimester, fetus 1 (HCC)      History reviewed. No pertinent surgical history.   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    Seat Belt Yes    Self-Exams Yes     Comment: self breast exam 2 times a week        Allergies:  No Known Allergies    OBJECTIVE:  PHYSICAL EXAM:  Vitals:    04/08/24 0928   BP: 108/80   Pulse: 78   Resp: 18   Temp: 97.5 °F (36.4 °C)   SpO2: 100%   Weight: 130 lb (59 kg)   Height: 5' 8\" (1.727 m)     Physical Examination: General appearance - alert, well appearing, and in no distress and normal appearing weight  Mental status - alert, oriented to person, place, and time, normal mood, behavior, speech, dress, motor activity, and thought processes  Ears - bilateral TM's and external ear canals normal  Neck - supple, no significant adenopathy  Chest - clear to auscultation, no wheezes, rales or rhonchi, symmetric air entry  Heart - normal rate, regular rhythm, normal S1, S2, no murmurs, rubs, clicks or gallops  Abdomen - soft, nontender, nondistended, no masses or organomegaly  Extremities - peripheral pulses normal, no pedal edema, no clubbing or cyanosis    ASSESSMENT & PLAN:  Brittany Greenwood is a 33 year old female is here for Physical    Problem List Items Addressed This Visit    None  Visit Diagnoses       Routine health maintenance    -  Primary    Relevant Orders    Estradiol    FSH AND LH    Moderate mixed hyperlipidemia not requiring statin therapy        Relevant Orders    Lipid Panel    Family history of first degree relative with dementia                Brittany was seen today for physical.    Diagnoses and all orders for this visit:    Routine health maintenance  Routine health maintenance reviewed, discussed and updated as below. This  includes: labs. Healthy diet and exercise reviewed.   -     Estradiol; Future  -     FSH AND LH; Future    Moderate mixed hyperlipidemia not requiring statin therapy  -   Will have patient continue with diet and exercise changes as we discussed today in clinic.  To repeat in 3 to 6 months: Lipid Panel; Future    Family history of first degree relative with dementia  Given strong family history of dementia with mom with early onset Alzheimer's, will look into genetic testing/counseling    Call or return to clinic prn if these symptoms worsen or fail to improve as anticipated. RTC in 1 year.   Kay Padilla DO  4/8/2024 9:36 AM    Note to Patient  The 21st Century Cures Act makes medical notes like these available to patients in the interest of transparency. However, be advised this is a medical document and is intended as gibh-my-qsod communication; it is written in medical language and may appear blunt, direct, or contain abbreviations or verbiage that are unfamiliar. Medical documents are intended to carry relevant information, facts as evident, and the clinical opinion of the practitioner.     This report has been produced using speech recognition software, and may contain errors related to grammar, punctuation, spelling, words or phrases unrecognized or not translated appropriately to text; these errors may be referred to the dictating provider for further clarification and/or addendum as needed.

## 2024-04-08 NOTE — PATIENT INSTRUCTIONS
Heart scan:  [email or web address].org/services/heart-vascular/heart-screenings/  OR call [phone]

## 2024-04-11 ENCOUNTER — TELEPHONE (OUTPATIENT)
Dept: FAMILY MEDICINE CLINIC | Facility: CLINIC | Age: 34
End: 2024-04-11

## 2024-04-11 NOTE — TELEPHONE ENCOUNTER
Our genetic counselor does not provide counseling for pre-symptomatic neuro patients.     She suggested considering:  [facility]-Medical Genetics [address] [place] [address], [address] [address] [phone]     Or     [facility])   Neurogenetics -    (Adela Mcfarland)[phone]     Can you let patient know?  Thanks!

## 2024-07-18 ENCOUNTER — HOSPITAL ENCOUNTER (OUTPATIENT)
Age: 34
Discharge: HOME | End: 2024-07-18
Payer: COMMERCIAL

## 2024-07-18 VITALS
OXYGEN SATURATION: 100 % | SYSTOLIC BLOOD PRESSURE: 103 MMHG | RESPIRATION RATE: 20 BRPM | DIASTOLIC BLOOD PRESSURE: 63 MMHG | WEIGHT: 130 LBS | HEIGHT: 67 IN | HEART RATE: 77 BPM | BODY MASS INDEX: 20.4 KG/M2 | TEMPERATURE: 97 F

## 2024-07-18 DIAGNOSIS — J01.00 ACUTE NON-RECURRENT MAXILLARY SINUSITIS: Primary | ICD-10-CM

## 2024-07-18 RX ORDER — AMOXICILLIN AND CLAVULANATE POTASSIUM 875; 125 MG/1; MG/1
1 TABLET, FILM COATED ORAL 2 TIMES DAILY
Qty: 14 TABLET | Refills: 0 | Status: SHIPPED | OUTPATIENT
Start: 2024-07-18 | End: 2024-07-25

## 2024-07-18 NOTE — ED PROVIDER NOTES
Patient Seen in: [facility]      History     Chief Complaint   Patient presents with    Sinus Problem     Stated Complaint: sinus headaches x 4 days    Subjective:   HPI    34-year-old female presents today with complaints of sinus headache and pain for about 4 days.  Patient states she has had this sinus type of pressure for about 10 days but it just progressively got worse and she cannot seem to get the sinus headache to go away over the last 4 days despite using over-the-counter remedies.  Patient states that her children were sick with similar symptoms but are doing fine.  Patient states she took Tylenol this morning with little relief.    Objective:   Past Medical History:    Anemia    Monochorionic diamniotic twin gestation in second trimester ([facility])    Twin pregnancy with single intrauterine death, second trimester, fetus 1 ([facility])              History reviewed. No pertinent surgical history.             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    HENT:  Positive for congestion, sinus pressure and sinus pain.    Eyes: Negative.    Respiratory: Negative.     Cardiovascular: Negative.    Gastrointestinal: Negative.    Endocrine: Negative.    Genitourinary: Negative.    Musculoskeletal: Negative.    Skin: Negative.    Allergic/Immunologic: Negative.    Neurological: Negative.    Hematological: Negative.     Psychiatric/Behavioral: Negative.         Positive for stated Chief Complaint: Sinus Problem    Other systems are as noted in HPI.  Constitutional and vital signs reviewed.      All other systems reviewed and negative except as noted above.    Physical Exam     ED Triage Vitals [07/18/24 0935]   /63   Pulse 77   Resp 20   Temp 97.1 °F (36.2 °C)   Temp src Temporal   SpO2 100 %   O2 Device None (Room air)       Current Vitals:   Vital Signs  BP: 103/63  Pulse: 77  Resp: 20  Temp: 97.1 °F (36.2 °C)  Temp src: Temporal    Oxygen Therapy  SpO2: 100 %  O2 Device: None (Room air)            Physical Exam  Vitals and nursing note reviewed.   Constitutional:       Appearance: Normal appearance. She is normal weight.   HENT:      Head: Normocephalic.      Right Ear: Tympanic membrane, ear canal and external ear normal.      Left Ear: Tympanic membrane, ear canal and external ear normal.      Nose: Congestion and rhinorrhea present.      Comments: Bilateral turbinates swollen.  Right side injected.  Tender to palpation over the right maxillary and frontal sinus.     Mouth/Throat:      Mouth: Mucous membranes are moist.      Pharynx: Oropharynx is clear.   Eyes:      Extraocular Movements: Extraocular movements intact.      Conjunctiva/sclera: Conjunctivae normal.      Pupils: Pupils are equal, round, and reactive to light.   Cardiovascular:      Rate and Rhythm: Normal rate and regular rhythm.      Pulses: Normal pulses.      Heart sounds: Normal heart sounds.   Pulmonary:      Effort: Pulmonary effort is normal.      Breath sounds: Normal breath sounds.   Musculoskeletal:      Cervical back: Normal range of motion and neck supple.   Skin:     General: Skin is warm.   Neurological:      General: No focal deficit present.      Mental Status: She is alert.   Psychiatric:         Mood and Affect: Mood normal.             ED Course   Labs Reviewed - No data to display                   MDM      34-year-old female presents  today with complaints of sinus headache and pain for about 4 days.  Patient states she has had this sinus type of pressure for about 10 days but it just progressively got worse and she cannot seem to get the sinus headache to go away over the last 4 days despite using over-the-counter remedies.  Patient states that her children were sick with similar symptoms but are doing fine.  Patient states she took Tylenol this morning with little relief.  Vital signs: Please see EMR.  Physical exam: Please see exam.  Differential diagnosis: Sinusitis, rhinitis, postnasal drip.  Based on physical exam and HPI we will diagnose with sinusitis and treat with Augmentin.  Patient instructed to increase her water intake 2 to 3 L a day.  ED precautions given.  Note to Patient  The 21st Century Cures Act makes medical notes like these available to patients in the interest of transparency. However, be advised this is a medical document and is intended as csxa-qa-nfwg communication; it is written in medical language and may appear blunt, direct, or contain abbreviations or verbiage that are unfamiliar. Medical documents are intended to carry relevant information, facts as evident, and the clinical opinion of the practitioner.     This report has been produced using speech recognition software, and may contain errors related to grammar, punctuation, spelling, words or phrases unrecognized or not translated appropriately to text; these errors may be referred to the dictating provider for further clarification and/or addendum as needed.                                     Medical Decision Making  34-year-old female presents today with complaints of sinus headache and pain for about 4 days.  Patient states she has had this sinus type of pressure for about 10 days but it just progressively got worse and she cannot seem to get the sinus headache to go away over the last 4 days despite using over-the-counter remedies.  Patient states that her  children were sick with similar symptoms but are doing fine.  Patient states she took Tylenol this morning with little relief.    Problems Addressed:  Acute non-recurrent maxillary sinusitis: acute illness or injury    Risk  OTC drugs.  Prescription drug management.        Disposition and Plan     Clinical Impression:  1. Acute non-recurrent maxillary sinusitis         Disposition:  Discharge  7/18/2024  9:43 am    Follow-up:  Kay Padilla,   [address]  [address]  [facility] [id number] [phone]    In 1 week            Medications Prescribed:  Current Discharge Medication List        START taking these medications    Details   amoxicillin clavulanate 875-125 MG Oral Tab Take 1 tablet by mouth 2 (two) times daily for 7 days.  Qty: 14 tablet, Refills: 0

## 2025-01-20 ENCOUNTER — TELEPHONE (OUTPATIENT)
Dept: FAMILY MEDICINE CLINIC | Facility: CLINIC | Age: 35
End: 2025-01-20

## 2025-01-20 DIAGNOSIS — Z00.00 ROUTINE HEALTH MAINTENANCE: Primary | ICD-10-CM

## 2025-01-20 NOTE — TELEPHONE ENCOUNTER
Please enter lab orders for the patient's upcoming physical appointment.     Physical scheduled:   Your appointments       Date & Time Appointment Department ([place])    Feb 24, 2025 8:40 AM CST Adult Physical with Kay Padilla DO [facility], [facility] ([facility])    PLEASE NOTE - Most insurances allow a Complete Physical once every 366 days. Please schedule accordingly.    Please arrive 15 minutes prior to your scheduled appointment. Please also bring your Insurance card, Photo ID, and your medication bottles or a list of your current medication.    If you no longer require this appointment, please contact your physician office to cancel.              [facility], [facility] [address]  [address] [place] [id number]  [phone]           Preferred lab: [facility] ([facility])     The patient has been notified to complete fasting labs prior to their physical appointment.

## 2025-03-20 ENCOUNTER — PATIENT MESSAGE (OUTPATIENT)
Dept: FAMILY MEDICINE CLINIC | Facility: CLINIC | Age: 35
End: 2025-03-20

## 2025-03-24 ENCOUNTER — LAB ENCOUNTER (OUTPATIENT)
Dept: LAB | Facility: HOSPITAL | Age: 35
End: 2025-03-24
Attending: FAMILY MEDICINE
Payer: COMMERCIAL

## 2025-03-24 DIAGNOSIS — E78.2 MODERATE MIXED HYPERLIPIDEMIA NOT REQUIRING STATIN THERAPY: ICD-10-CM

## 2025-03-24 DIAGNOSIS — Z00.00 ROUTINE HEALTH MAINTENANCE: ICD-10-CM

## 2025-03-24 LAB
ALBUMIN SERPL-MCNC: 4.5 G/DL (ref 3.2–4.8)
ALBUMIN/GLOB SERPL: 1.8 {RATIO} (ref 1–2)
ALP LIVER SERPL-CCNC: 42 U/L
ALT SERPL-CCNC: 17 U/L
ANION GAP SERPL CALC-SCNC: 5 MMOL/L (ref 0–18)
AST SERPL-CCNC: 20 U/L (ref ?–34)
BASOPHILS # BLD AUTO: 0.03 X10(3) UL (ref 0–0.2)
BASOPHILS NFR BLD AUTO: 1.1 %
BILIRUB SERPL-MCNC: 0.7 MG/DL (ref 0.3–1.2)
BUN BLD-MCNC: 16 MG/DL (ref 9–23)
CALCIUM BLD-MCNC: 9.2 MG/DL (ref 8.7–10.6)
CHLORIDE SERPL-SCNC: 107 MMOL/L (ref 98–112)
CHOLEST SERPL-MCNC: 184 MG/DL (ref ?–200)
CO2 SERPL-SCNC: 28 MMOL/L (ref 21–32)
CREAT BLD-MCNC: 0.73 MG/DL
EGFRCR SERPLBLD CKD-EPI 2021: 110 ML/MIN/1.73M2 (ref 60–?)
EOSINOPHIL # BLD AUTO: 0.1 X10(3) UL (ref 0–0.7)
EOSINOPHIL NFR BLD AUTO: 3.6 %
ERYTHROCYTE [DISTWIDTH] IN BLOOD BY AUTOMATED COUNT: 13.3 %
EST. AVERAGE GLUCOSE BLD GHB EST-MCNC: 117 MG/DL (ref 68–126)
ESTRADIOL SERPL-MCNC: 87.6 PG/ML
FASTING PATIENT LIPID ANSWER: YES
FASTING STATUS PATIENT QL REPORTED: YES
FSH SERPL-ACNC: 6 MIU/ML
GLOBULIN PLAS-MCNC: 2.5 G/DL (ref 2–3.5)
GLUCOSE BLD-MCNC: 88 MG/DL (ref 70–99)
HBA1C MFR BLD: 5.7 % (ref ?–5.7)
HCT VFR BLD AUTO: 38 %
HDLC SERPL-MCNC: 59 MG/DL (ref 40–59)
HGB BLD-MCNC: 12.3 G/DL
IMM GRANULOCYTES # BLD AUTO: 0 X10(3) UL (ref 0–1)
IMM GRANULOCYTES NFR BLD: 0 %
LDLC SERPL CALC-MCNC: 115 MG/DL (ref ?–100)
LH SERPL-ACNC: 4.8 MIU/ML
LYMPHOCYTES # BLD AUTO: 1.46 X10(3) UL (ref 1–4)
LYMPHOCYTES NFR BLD AUTO: 53.1 %
MCH RBC QN AUTO: 28.5 PG (ref 26–34)
MCHC RBC AUTO-ENTMCNC: 32.4 G/DL (ref 31–37)
MCV RBC AUTO: 88 FL
MONOCYTES # BLD AUTO: 0.35 X10(3) UL (ref 0.1–1)
MONOCYTES NFR BLD AUTO: 12.7 %
NEUTROPHILS # BLD AUTO: 0.81 X10 (3) UL (ref 1.5–7.7)
NEUTROPHILS # BLD AUTO: 0.81 X10(3) UL (ref 1.5–7.7)
NEUTROPHILS NFR BLD AUTO: 29.5 %
NONHDLC SERPL-MCNC: 125 MG/DL (ref ?–130)
OSMOLALITY SERPL CALC.SUM OF ELEC: 291 MOSM/KG (ref 275–295)
PLATELET # BLD AUTO: 212 10(3)UL (ref 150–450)
POTASSIUM SERPL-SCNC: 4.5 MMOL/L (ref 3.5–5.1)
PROT SERPL-MCNC: 7 G/DL (ref 5.7–8.2)
RBC # BLD AUTO: 4.32 X10(6)UL
SODIUM SERPL-SCNC: 140 MMOL/L (ref 136–145)
T4 FREE SERPL-MCNC: 0.9 NG/DL (ref 0.8–1.7)
TRIGL SERPL-MCNC: 52 MG/DL (ref 30–149)
TSI SER-ACNC: 4.84 UIU/ML (ref 0.55–4.78)
VLDLC SERPL CALC-MCNC: 9 MG/DL (ref 0–30)
WBC # BLD AUTO: 2.8 X10(3) UL (ref 4–11)

## 2025-03-24 PROCEDURE — 84439 ASSAY OF FREE THYROXINE: CPT

## 2025-03-24 PROCEDURE — 83001 ASSAY OF GONADOTROPIN (FSH): CPT

## 2025-03-24 PROCEDURE — 85025 COMPLETE CBC W/AUTO DIFF WBC: CPT

## 2025-03-24 PROCEDURE — 83036 HEMOGLOBIN GLYCOSYLATED A1C: CPT

## 2025-03-24 PROCEDURE — 36415 COLL VENOUS BLD VENIPUNCTURE: CPT

## 2025-03-24 PROCEDURE — 82670 ASSAY OF TOTAL ESTRADIOL: CPT

## 2025-03-24 PROCEDURE — 80053 COMPREHEN METABOLIC PANEL: CPT

## 2025-03-24 PROCEDURE — 83002 ASSAY OF GONADOTROPIN (LH): CPT

## 2025-03-24 PROCEDURE — 80061 LIPID PANEL: CPT

## 2025-03-24 PROCEDURE — 84443 ASSAY THYROID STIM HORMONE: CPT

## 2025-03-28 ENCOUNTER — OFFICE VISIT (OUTPATIENT)
Dept: FAMILY MEDICINE CLINIC | Facility: CLINIC | Age: 35
End: 2025-03-28
Payer: COMMERCIAL

## 2025-03-28 VITALS
BODY MASS INDEX: 20 KG/M2 | TEMPERATURE: 97 F | DIASTOLIC BLOOD PRESSURE: 62 MMHG | WEIGHT: 129 LBS | HEART RATE: 88 BPM | OXYGEN SATURATION: 100 % | SYSTOLIC BLOOD PRESSURE: 118 MMHG | RESPIRATION RATE: 16 BRPM

## 2025-03-28 DIAGNOSIS — R19.7 DIARRHEA, UNSPECIFIED TYPE: ICD-10-CM

## 2025-03-28 DIAGNOSIS — R10.13 EPIGASTRIC ABDOMINAL PAIN: ICD-10-CM

## 2025-03-28 DIAGNOSIS — D70.9 NEUTROPENIA, UNSPECIFIED TYPE: ICD-10-CM

## 2025-03-28 DIAGNOSIS — E55.9 VITAMIN D DEFICIENCY: ICD-10-CM

## 2025-03-28 DIAGNOSIS — L65.9 HAIR THINNING: Primary | ICD-10-CM

## 2025-03-28 DIAGNOSIS — E03.8 SUBCLINICAL HYPOTHYROIDISM: ICD-10-CM

## 2025-03-28 PROBLEM — M84.363A STRESS FRACTURE OF RIGHT FIBULA: Status: ACTIVE | Noted: 2024-08-08

## 2025-03-28 PROBLEM — S82.52XA CLOSED FRACTURE OF MEDIAL MALLEOLUS OF LEFT TIBIA: Status: ACTIVE | Noted: 2024-08-08

## 2025-03-28 PROBLEM — M67.969: Status: ACTIVE | Noted: 2024-08-08

## 2025-03-28 PROBLEM — S82.65XD: Status: ACTIVE | Noted: 2024-06-05

## 2025-03-28 PROBLEM — M84.362A STRESS FRACTURE OF LEFT TIBIA: Status: ACTIVE | Noted: 2024-08-08

## 2025-03-28 PROCEDURE — 3078F DIAST BP <80 MM HG: CPT | Performed by: FAMILY MEDICINE

## 2025-03-28 PROCEDURE — 3074F SYST BP LT 130 MM HG: CPT | Performed by: FAMILY MEDICINE

## 2025-03-28 PROCEDURE — 99215 OFFICE O/P EST HI 40 MIN: CPT | Performed by: FAMILY MEDICINE

## 2025-03-28 NOTE — PROGRESS NOTES
The following individual(s) verbally consented to be recorded using ambient AI listening technology and understand that they can each withdraw their consent to this listening technology at any point by asking the clinician to turn off or pause the recording:    Patient name: Brittany Greenwood

## 2025-03-28 NOTE — PROGRESS NOTES
Subjective:   Brittany Greenwood is a 34 year old female who presents for Lab Results (Blood work completed 3/24/2025, high TSH levels) and Hair/Scalp Problem (Hair thinning)     History of Present Illness  Brittany Greenwood is a 34 year old female who presents with hair thinning and gastrointestinal symptoms.    She has been experiencing hair thinning since September or October, following breaking both ankles. Initially, she noticed diffuse hair shedding without patches. Her ponytail appears thinner, and the hairline is uneven. She has been taking Nutrafol and observes some new hair growth, though she continues to experience shedding. She associates the hair thinning with a high TSH level noted in recent lab work.    She reports gastrointestinal symptoms, including diarrhea and soft stools, persisting for months. Her bowel movements occur regularly every morning, with variability between soft stools and pure diarrhea. Her symptoms resolved during a week-long trip to [facility], only to return upon her return to the [place]. She maintains a diet high in protein, consuming about 150 grams daily. She also reports upper abdominal tenderness and a sensation of inflammation, described as feeling like acid.    Her past medical history includes a previous episode of stomach pain for which she was prescribed Prilosec, which resolved the issue at that time. She has a history of fluctuating white blood cell counts since college, with a current count of 2.8 and low neutrophils at 0.81. She is concerned about her elevated TSH level of 4.837, normal free T4, and a hemoglobin A1c of 5.7%, which is in the prediabetes range.    She has a family history of Alzheimer's disease, with both parents diagnosed about a year apart when she was 25 years old.     Chief Complaint Reviewed and Verified  Nursing Notes Reviewed and   Verified  Tobacco Reviewed  Allergies Reviewed  Medications Reviewed    Problem List Reviewed  Medical  History Reviewed  Surgical History   Reviewed  OB Status Reviewed  Family History Reviewed         Tobacco:  She has never smoked tobacco.    No current outpatient medications on file.     Review of Systems:  Pertinent items are noted in HPI.    Objective:   /62 (BP Location: Right arm)   Pulse 88   Temp 97.4 °F (36.3 °C) (Temporal)   Resp 16   Wt 129 lb (58.5 kg)   LMP 03/03/2025 (Approximate)   SpO2 100%   Breastfeeding No   BMI 20.20 kg/m²  Estimated body mass index is 20.2 kg/m² as calculated from the following:    Height as of 7/18/24: 5' 7\" (1.702 m).    Weight as of this encounter: 129 lb (58.5 kg).  Results  LABS  FSH: 6 mIU/mL (03/24/2025)  LH: 4.8 mIU/mL (03/24/2025)  Total Cholesterol: 184 mg/dL (03/24/2025)  HDL: 59 mg/dL (03/24/2025)  Triglycerides: 52 mg/dL (03/24/2025)  LDL: 115 mg/dL (03/24/2025)  WBC: 2.8 x10^3/µL (03/24/2025)  Neutrophils: 0.81 x10^3/µL (03/24/2025)  TSH: 4.837 µIU/mL (03/24/2025)  HbA1c: 5.7% (03/24/2025)     Physical Exam  GENERAL: Alert, cooperative, well developed, no acute distress  HEENT: Normocephalic, normal oropharynx, moist mucous membranes  CHEST: Clear to auscultation bilaterally, no wheezes, rhonchi, or crackles  CARDIOVASCULAR: Normal heart rate and rhythm, S1 and S2 normal without murmurs  ABDOMEN: Soft, tenderness in upper abdomen, non-distended, without organomegaly, normal bowel sounds  EXTREMITIES: No cyanosis or edema  NEUROLOGICAL: Cranial nerves grossly intact, moves all extremities without gross motor or sensory deficit      Assessment & Plan:   1. Hair thinning (Primary)  -     Ferritin; Future; Expected date: 03/28/2025  -     Zinc, Plasma Or Serum; Future; Expected date: 03/28/2025  -     Vitamin D; Future; Expected date: 03/28/2025  2. Vitamin D deficiency  -     Vitamin D; Future; Expected date: 03/28/2025  3. Neutropenia, unspecified type  -     CBC With Differential With Platelet; Future; Expected date: 03/28/2025  4. Subclinical  hypothyroidism  -     TSH and Free T4; Future; Expected date: 03/28/2025  -     Thyroid peroxidase & thyroglobulin ab; Future; Expected date: 03/28/2025  5. Diarrhea, unspecified type  -     CELIAC DISEASE SCREEN Reflex; Future; Expected date: 03/28/2025  -     Stool Culture W/Shigatoxin; Future; Expected date: 03/28/2025  -     Ova and Parasites(P); Future; Expected date: 03/28/2025  -     C. diff toxigenic PCR (OPT); Future; Expected date: 03/28/2025  -     Calprotectin, Fecal; Future; Expected date: 03/28/2025  6. Epigastric abdominal pain  -     H. Pylori Stool Ag, EIA; Future; Expected date: 03/28/2025  -     Lipase; Future; Expected date: 03/28/2025    Assessment & Plan  Chronic Diarrhea  Chronic diarrhea with alternating soft stools and diarrhea for several months. Symptoms improved during travel to [facility], suggesting a possible dietary or environmental trigger. Upper abdominal tenderness may indicate gastrointestinal inflammation or infection. Stress or dietary factors are potential contributors.  - Order stool studies to rule out infection, including C. diff, bacteria, and parasites  - Test for H. pylori infection  - Consider inflammatory markers in stool if initial tests are negative  - Start Prilosec after stool collection to assess for symptom improvement    Hair Thinning  Diffuse hair thinning since September/October, possibly due to stress-induced telogen effluvium following bilateral ankle fractures. Hair shedding continues, but some new growth observed. Elevated TSH may contribute to hair thinning, but she is hesitant to start thyroid medication without further investigation. Nutrafol is being used for hair growth support.  - Continue Nutrafol for hair growth support  - Check vitamin D, zinc, and iron levels  - Recheck TSH and consider thyroid antibodies to evaluate for Hashimoto's thyroiditis    Elevated TSH  TSH is elevated at 4.837 with normal free T4, suggesting subclinical hypothyroidism.  Symptoms include hair thinning. Endocrinologists recommend monitoring rather than immediate treatment due to mild elevation. Discussed potential for thyroid medication to stabilize TSH and improve symptoms, but also the possibility of lifelong medication if started.  - Recheck TSH in 4 weeks  - Consider thyroid antibodies to evaluate for Hashimoto's thyroiditis  - Discuss potential thyroid medication with endocrinologist if symptoms worsen    Prediabetes  Hemoglobin A1c is 5.7%, indicating prediabetes. She maintains a healthy diet and exercise routine, making the elevated A1c unexpected. Further monitoring is needed to determine if this is a transient finding or a developing condition. Discussed monitoring sugar intake and potential impact of stress or thyroid issues on glucose levels.  - Monitor sugar intake, including hidden sugars in beverages and carbohydrates  - Recheck A1c in 3 months    Leukopenia  White blood cell count is low at 2.8 with low neutrophils. Fluctuating white blood cell counts since college suggest a benign pattern, but further investigation is warranted to rule out underlying causes. Stress or other systemic issues are potential contributors.  - Recheck white blood cell count in a few weeks  - Consider additional testing if leukopenia persists    Epigastric abdominal pain  Advised to avoid acidic foods and beverages to manage gastrointestinal symptoms.  - Avoid coffee, tomatoes, pasta sauces, and citrus juices  - Limit eating before bedtime    Follow-up  Follow-up plans to monitor and evaluate ongoing symptoms and lab results. Coordination with Dr. Mccartney for comprehensive evaluation and potential overlap in testing.  - Follow up with Dr. Mccartney for a comprehensive physical and blood panel  - Re-evaluate symptoms and lab results in 4 weeks  - Consider referral to gastroenterologist if symptoms persist    Recording duration: 34 minutes    Concerning signs and symptoms that warrant returning to  the clinic reviewed and patient demonstrated understanding.    Spent 41 minutes reviewing chart and records, obtaining history, evaluating patient, discussing treatment options, counseling on above and completing documentation.     Kay Padilla DO, 3/28/2025, 12:09 PM

## 2025-03-29 ENCOUNTER — LAB ENCOUNTER (OUTPATIENT)
Dept: LAB | Facility: HOSPITAL | Age: 35
End: 2025-03-29
Attending: FAMILY MEDICINE
Payer: COMMERCIAL

## 2025-03-29 DIAGNOSIS — R10.13 EPIGASTRIC ABDOMINAL PAIN: ICD-10-CM

## 2025-03-29 DIAGNOSIS — D70.9 NEUTROPENIA, UNSPECIFIED TYPE: ICD-10-CM

## 2025-03-29 DIAGNOSIS — R19.7 DIARRHEA, UNSPECIFIED TYPE: ICD-10-CM

## 2025-03-29 DIAGNOSIS — E55.9 VITAMIN D DEFICIENCY: ICD-10-CM

## 2025-03-29 DIAGNOSIS — L65.9 HAIR THINNING: ICD-10-CM

## 2025-03-29 LAB
BASOPHILS # BLD AUTO: 0.04 X10(3) UL (ref 0–0.2)
BASOPHILS NFR BLD AUTO: 1.6 %
C DIFF TOX B STL QL: NEGATIVE
DEPRECATED HBV CORE AB SER IA-ACNC: 32 NG/ML
EOSINOPHIL # BLD AUTO: 0.03 X10(3) UL (ref 0–0.7)
EOSINOPHIL NFR BLD AUTO: 1.2 %
ERYTHROCYTE [DISTWIDTH] IN BLOOD BY AUTOMATED COUNT: 13.4 %
HCT VFR BLD AUTO: 39.7 %
HGB BLD-MCNC: 12.9 G/DL
IGA SERPL-MCNC: 127 MG/DL (ref 70–312)
IMM GRANULOCYTES # BLD AUTO: 0 X10(3) UL (ref 0–1)
IMM GRANULOCYTES NFR BLD: 0 %
LIPASE SERPL-CCNC: 39 U/L (ref 12–53)
LYMPHOCYTES # BLD AUTO: 0.96 X10(3) UL (ref 1–4)
LYMPHOCYTES NFR BLD AUTO: 38.7 %
MCH RBC QN AUTO: 28.3 PG (ref 26–34)
MCHC RBC AUTO-ENTMCNC: 32.5 G/DL (ref 31–37)
MCV RBC AUTO: 87.1 FL
MONOCYTES # BLD AUTO: 0.31 X10(3) UL (ref 0.1–1)
MONOCYTES NFR BLD AUTO: 12.5 %
NEUTROPHILS # BLD AUTO: 1.14 X10 (3) UL (ref 1.5–7.7)
NEUTROPHILS # BLD AUTO: 1.14 X10(3) UL (ref 1.5–7.7)
NEUTROPHILS NFR BLD AUTO: 46 %
PLATELET # BLD AUTO: 234 10(3)UL (ref 150–450)
RBC # BLD AUTO: 4.56 X10(6)UL
VIT D+METAB SERPL-MCNC: 49.5 NG/ML (ref 30–100)
WBC # BLD AUTO: 2.5 X10(3) UL (ref 4–11)

## 2025-03-29 PROCEDURE — 87493 C DIFF AMPLIFIED PROBE: CPT

## 2025-03-29 PROCEDURE — 83993 ASSAY FOR CALPROTECTIN FECAL: CPT

## 2025-03-29 PROCEDURE — 87045 FECES CULTURE AEROBIC BACT: CPT

## 2025-03-29 PROCEDURE — 87177 OVA AND PARASITES SMEARS: CPT

## 2025-03-29 PROCEDURE — 82784 ASSAY IGA/IGD/IGG/IGM EACH: CPT

## 2025-03-29 PROCEDURE — 84630 ASSAY OF ZINC: CPT

## 2025-03-29 PROCEDURE — 87015 SPECIMEN INFECT AGNT CONCNTJ: CPT

## 2025-03-29 PROCEDURE — 87338 HPYLORI STOOL AG IA: CPT

## 2025-03-29 PROCEDURE — 85025 COMPLETE CBC W/AUTO DIFF WBC: CPT

## 2025-03-29 PROCEDURE — 87209 SMEAR COMPLEX STAIN: CPT

## 2025-03-29 PROCEDURE — 87427 SHIGA-LIKE TOXIN AG IA: CPT

## 2025-03-29 PROCEDURE — 83690 ASSAY OF LIPASE: CPT

## 2025-03-29 PROCEDURE — 36415 COLL VENOUS BLD VENIPUNCTURE: CPT

## 2025-03-29 PROCEDURE — 86364 TISS TRNSGLTMNASE EA IG CLAS: CPT

## 2025-03-29 PROCEDURE — 82728 ASSAY OF FERRITIN: CPT

## 2025-03-29 PROCEDURE — 87046 STOOL CULTR AEROBIC BACT EA: CPT

## 2025-03-29 PROCEDURE — 82306 VITAMIN D 25 HYDROXY: CPT

## 2025-03-30 LAB — CALPROTECTIN STL-MCNT: 29.6 ΜG/G (ref ?–50)

## 2025-03-31 LAB — TTG IGA SER-ACNC: <0.2 U/ML (ref ?–7)

## 2025-04-01 ENCOUNTER — PATIENT MESSAGE (OUTPATIENT)
Dept: FAMILY MEDICINE CLINIC | Facility: CLINIC | Age: 35
End: 2025-04-01

## 2025-04-01 LAB — H PYLORI AG STL QL IA: NEGATIVE

## 2025-04-03 ENCOUNTER — OFFICE VISIT (OUTPATIENT)
Facility: CLINIC | Age: 35
End: 2025-04-03
Payer: COMMERCIAL

## 2025-04-03 VITALS
HEIGHT: 67 IN | OXYGEN SATURATION: 98 % | BODY MASS INDEX: 19.99 KG/M2 | RESPIRATION RATE: 16 BRPM | TEMPERATURE: 98 F | WEIGHT: 127.38 LBS | DIASTOLIC BLOOD PRESSURE: 60 MMHG | SYSTOLIC BLOOD PRESSURE: 110 MMHG

## 2025-04-03 DIAGNOSIS — L65.0 TELOGEN EFFLUVIUM: Primary | ICD-10-CM

## 2025-04-03 DIAGNOSIS — R19.7 DIARRHEA, UNSPECIFIED TYPE: ICD-10-CM

## 2025-04-03 DIAGNOSIS — D72.819 LEUKOPENIA, UNSPECIFIED TYPE: ICD-10-CM

## 2025-04-03 DIAGNOSIS — R79.89 ELEVATED TSH: ICD-10-CM

## 2025-04-03 DIAGNOSIS — D50.9 IRON DEFICIENCY ANEMIA, UNSPECIFIED IRON DEFICIENCY ANEMIA TYPE: ICD-10-CM

## 2025-04-03 DIAGNOSIS — R73.03 PREDIABETES: ICD-10-CM

## 2025-04-03 DIAGNOSIS — R10.13 EPIGASTRIC PAIN: ICD-10-CM

## 2025-04-03 PROBLEM — S82.65XD: Status: RESOLVED | Noted: 2024-06-05 | Resolved: 2025-04-03

## 2025-04-03 PROBLEM — S82.52XA CLOSED FRACTURE OF MEDIAL MALLEOLUS OF LEFT TIBIA: Status: RESOLVED | Noted: 2024-08-08 | Resolved: 2025-04-03

## 2025-04-03 PROBLEM — M84.362A STRESS FRACTURE OF LEFT TIBIA: Status: RESOLVED | Noted: 2024-08-08 | Resolved: 2025-04-03

## 2025-04-03 PROBLEM — M84.363A STRESS FRACTURE OF RIGHT FIBULA: Status: RESOLVED | Noted: 2024-08-08 | Resolved: 2025-04-03

## 2025-04-03 PROBLEM — M67.969: Status: RESOLVED | Noted: 2024-08-08 | Resolved: 2025-04-03

## 2025-04-03 PROCEDURE — 3078F DIAST BP <80 MM HG: CPT | Performed by: INTERNAL MEDICINE

## 2025-04-03 PROCEDURE — 3008F BODY MASS INDEX DOCD: CPT | Performed by: INTERNAL MEDICINE

## 2025-04-03 PROCEDURE — 3074F SYST BP LT 130 MM HG: CPT | Performed by: INTERNAL MEDICINE

## 2025-04-03 PROCEDURE — 99204 OFFICE O/P NEW MOD 45 MIN: CPT | Performed by: INTERNAL MEDICINE

## 2025-04-03 RX ORDER — SUCRALFATE ORAL 1 G/10ML
1 SUSPENSION ORAL
Qty: 473 ML | Refills: 0 | Status: SHIPPED | OUTPATIENT
Start: 2025-04-03

## 2025-04-03 NOTE — PATIENT INSTRUCTIONS
VISIT SUMMARY:    During your visit, we discussed your concerns about thyroid function, gastrointestinal issues, and hair thinning. We reviewed your recent lab results and symptoms, and we have developed a comprehensive plan to address each of these issues.    YOUR PLAN:    -TELOGEN EFFLUVIUM: Telogen effluvium is a condition where stress causes hair to thin and shed. We will administer an IV iron infusion to quickly increase your iron levels, discontinue oral iron after the infusion, and consider PRP therapy for hair regrowth. Continue using Nutrafol for hair support.    -IRON DEFICIENCY ANEMIA: Iron deficiency anemia occurs when your body lacks enough iron, leading to symptoms like lethargy. We will administer an IV iron infusion to rapidly increase your iron levels, discontinue oral iron after the infusion, and double your oral iron dose until the IV iron is administered.    -THYROID DYSFUNCTION: Thyroid dysfunction, indicated by an elevated TSH level, may be due to autoimmune thyroiditis and can contribute to hair thinning and gastrointestinal symptoms. We will order a thyroid ultrasound to assess for autoimmune thyroiditis and monitor your thyroid function tests.    -GASTROINTESTINAL SYMPTOMS: Your chronic loose stools and occasional diarrhea may be due to peptic ulcer disease or gallbladder issues. We will refer you to a gastroenterologist for further evaluation and possible endoscopy and colonoscopy. In the meantime, we will prescribe sucralfate to manage potential ulcer symptoms.    -BENIGN ETHNIC NEUTROPENIA: Benign ethnic neutropenia is a condition where certain ethnic groups have naturally lower white blood cell counts without an increased risk of infection. No specific treatment is needed.    -PRE-DIABETES: Pre-diabetes is a condition where your blood sugar levels are higher than normal but not high enough to be classified as diabetes. We will consider continuous glucose monitoring to assess your glucose  levels, implement time-restricted eating (16:8 fasting), and consider genetic testing for diabetes predisposition.    INSTRUCTIONS:    Please follow up with the gastroenterologist for further evaluation of your gastrointestinal symptoms. We will monitor your response to the IV iron infusion and adjust treatment as needed. Additionally, we will follow up on the results of your thyroid ultrasound and evaluate any continuous glucose monitoring data if implemented.

## 2025-04-03 NOTE — PROGRESS NOTES
The 21st Century Cures Act makes medical notes like these available to patients in the interest of transparency. Please be advised this is a medical document. Medical documents are intended to carry relevant information, facts as evident, and the clinical opinion of the practitioner. The medical note is intended as peer to peer communication and may appear blunt or direct. It is written in medical language and may contain abbreviations or verbiage that are unfamiliar.          The following individual(s) verbally consented to be recorded using ambient AI listening technology and understand that they can each withdraw their consent to this listening technology at any point by asking the clinician to turn off or pause the recording:    Patient name: Brittany Greenwood      Chief Complaint   Patient presents with    Establish Care     Loose stools, abdominal pain, hair loss        History of Present Illness  Brittany Greenwood is a 34 year old female who presents with concerns about thyroid function, gastrointestinal issues, and hair thinning.    She is concerned about her thyroid function after recent lab work showed a high TSH level. She experiences hair thinning and suboptimal vitality. She is awaiting thyroid antibody testing.    Hair thinning began after recovering from bilateral ankle fractures in June of the previous year, attributed to stress and possibly telogen effluvium. She uses Nutrafol, noting some regrowth, but hair remains thin with diffuse shedding. A low ferritin level of 32 is noted, and she takes 25 mg oral iron supplements, considering increasing to 50 mg.    She experiences loose stools and occasional diarrhea. A trip to [facility] resulted in solid stools, which she attributes to 'travel constipation.' She has had upper abdominal pain in the past, treated with Prilosec, which provided relief. Recently, she restarted Prilosec due to upper abdominal tenderness and acidity but discontinued it after  experiencing diarrhea, suspecting a medication link.    She has a history of fluctuating white blood cell counts since college, initially noted during an annual check-up with a count of 1.7, causing anxiety. Subsequent tests showed normalization, but fluctuations persist without specialist evaluation.    In June of the previous year, she fractured both ankles while training for a competition, leading to significant stress and menstrual cycle changes, including missing two months.    She has a family history of Alzheimer's disease, with both parents affected. She maintains a healthy lifestyle, working out six days a week and being mindful of her diet.      Review of Systems   All other systems reviewed and are negative.    Patient Active Problem List   Diagnosis    Telogen effluvium    Iron deficiency anemia    Elevated TSH    Diarrhea    Epigastric pain    Leukopenia    Prediabetes       History reviewed. No pertinent surgical history.    Patient has no known allergies.    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  GENERAL: Alert and oriented, no apparent distress.  NECK: Carotid arteries 2+ bilaterally, no carotid bruits. No palpable thyromegaly or thyroid enlargement.  CHEST: Lungs clear to auscultation bilaterally.  CARDIOVASCULAR: Regular rate and rhythm, normal S1, S2.  ABDOMEN: Normal active bowel sounds. Abdomen non-distended, soft, and nontender.  NEUROLOGICAL: Alert and oriented times three.      Results  LABS  Tissue transglutaminase IgA antibody: <0.2 (03/29/2025)  Lipase: 39 (03/29/2025)  Fecal calprotectin: 29.6 (03/29/2025)  C. difficile toxin B: Negative (03/29/2025)  Ova and parasites: Negative (03/29/2025)  H. pylori stool antigen: Negative (03/29/2025)  Stool culture for E. coli shiga toxin: Negative (03/29/2025)  Immunoglobulin A: 127.0 (03/29/2025)  WBC: 2.5 (03/29/2025)  Hb: 12.9 (03/29/2025)  PLT: 234 (03/29/2025)  Vitamin D: 49.5 (03/29/2025)  Ferritin: 32 (03/29/2025)  Free T4: 0.9 (03/24/2025)  HbA1c: 5.7 (03/24/2025)  TSH: 4.837 (03/24/2025)  Glucose: 88 (03/24/2025)  Na: 140 (03/24/2025)  K: 4.5 (03/24/2025)  BUN: 16 (03/24/2025)  Cr: 0.73 (03/24/2025)  ALT: 17 (03/24/2025)  AST: 20 (03/24/2025)      Assessment & Plan  Telogen Effluvium  Hair thinning and shedding since August post-ankle fractures and stress. Confirmed telogen effluvium with low ferritin indicating iron deficiency. Hair growing but thin.  - Administer IV iron infusion for rapid bioavailability.  - Discontinue oral iron post-IV iron.  - Double oral iron dose until IV iron administered.  - Consider PRP therapy for hair regrowth.  - Continue Nutrafol for hair support.    Iron Deficiency Anemia  Ferritin level 32 indicates iron deficiency anemia, likely from menstrual losses and possible malabsorption. Symptoms include lethargy pre-menstrual cycle.  - Administer IV iron infusion to rapidly increase iron levels.  - Discontinue oral iron post-IV iron.  - Double oral iron  dose until IV iron administered.  - See GI to rule out GI losses for iron deficiency.    Thyroid Dysfunction  Elevated TSH at 4.837 suggests possible autoimmune thyroiditis. Thyroid dysfunction may contribute to hair thinning and gastrointestinal symptoms.  - Order thyroid ultrasound to assess for autoimmune thyroiditis.  - Monitor thyroid function tests.    Gastrointestinal Symptoms  Chronic loose stools, negative stool tests. Possible peptic ulcer disease or gallbladder issues. Referral to gastroenterologist necessary.  - Refer to gastroenterologist for evaluation and possible endoscopy and colonoscopy.  - Prescribe sucralfate to manage potential ulcer symptoms.    Benign Ethnic Neutropenia  Low white blood cell count likely due to benign ethnic neutropenia, not associated with increased infection risk.    Pre-Diabetes  Hemoglobin A1c 5.7 indicates pre-diabetes. No family history of diabetes. Possible genetic predisposition.  - Consider continuous glucose monitoring to assess glucose levels.  - Implement time-restricted eating (16:8 fasting) to manage glucose levels.  - Consider genetic testing for predisposition to diabetes.    Follow-up  Coordination with specialists and monitoring of treatment responses are essential for comprehensive care.  - Coordinate with gastroenterologist for appointment scheduling.  - Monitor response to IV iron infusion and adjust treatment as needed.  - Follow up on thyroid ultrasound results.  - Evaluate continuous glucose monitoring data if implemented.    She verbally agrees to and understands the plan as outlined above.  She was also afforded the time and opportunity to ask questions, which were then answered to the best of my ability.      Zachery Mccartney MD  [facility] Physician  Diplomate, American Board of Internal Medicine  Member, American College of Lifestyle Medicine  Member, American Association for Physician Leadership  [facility]  [address]  [facility], [facility] [address] [address]  [phone] (phone); [phone] (fax)  [email or web address]              [1]   No current outpatient medications on file prior to visit.     No current facility-administered medications on file prior to visit.

## 2025-04-04 LAB — ZINC: 100 UG/DL

## 2025-04-07 ENCOUNTER — NURSE ONLY (OUTPATIENT)
Facility: CLINIC | Age: 35
End: 2025-04-07
Payer: COMMERCIAL

## 2025-04-07 ENCOUNTER — TELEPHONE (OUTPATIENT)
Facility: CLINIC | Age: 35
End: 2025-04-07

## 2025-04-07 DIAGNOSIS — R73.03 PREDIABETES: ICD-10-CM

## 2025-04-07 DIAGNOSIS — Z00.00 LABORATORY EXAMINATION ORDERED AS PART OF A ROUTINE GENERAL MEDICAL EXAMINATION: Primary | ICD-10-CM

## 2025-04-07 PROBLEM — Z67.A1 BENIGN ETHNIC NEUTROPENIA: Status: ACTIVE | Noted: 2025-04-03

## 2025-04-07 LAB
AMB EXT BILIRUBIN, TOTAL: 0.9 MG/DL
AMB EXT BILIRUBIN, TOTAL: 0.9 MG/DL
AMB EXT BUN: 14 MG/DL
AMB EXT BUN: 14 MG/DL
AMB EXT CALCIUM: 9.1
AMB EXT CALCIUM: 9.1
AMB EXT CARBON DIOXIDE: 24
AMB EXT CARBON DIOXIDE: 24
AMB EXT CHLORIDE: 102
AMB EXT CHLORIDE: 85
AMB EXT CHOL/HDL RATIO: 2.8
AMB EXT CHOLESTEROL, TOTAL: 196 MG/DL
AMB EXT CMP ALT: 16 U/L (ref 6–29)
AMB EXT CMP ALT: 16 U/L (ref 6–29)
AMB EXT CMP AST: 21 U/L (ref 10–30)
AMB EXT CMP AST: 21 U/L (ref 10–30)
AMB EXT CREATININE: 0.62 MG/DL
AMB EXT CREATININE: 0.62 MG/DL
AMB EXT GLUCOSE: 85 MG/DL
AMB EXT HDL CHOLESTEROL: 70 MG/DL
AMB EXT HEMATOCRIT: 38.8 (ref 35–45)
AMB EXT HEMOGLOBIN: 12.4 (ref 11.7–15.5)
AMB EXT HGBA1C: 5.5 %
AMB EXT LDL CHOLESTEROL, DIRECT: 114 MG/DL
AMB EXT MCV: 89.2 (ref 80–100)
AMB EXT NON HDL CHOL: 126 MG/DL
AMB EXT PLATELETS: 239 (ref 140–400)
AMB EXT POSTASSIUM: 4.5 MMOL/L (ref 3.5–5.1)
AMB EXT POSTASSIUM: 4.5 MMOL/L (ref 3.5–5.1)
AMB EXT SODIUM: 138 MMOL/L (ref 136–145)
AMB EXT SODIUM: 138 MMOL/L (ref 136–145)
AMB EXT TOTAL PROTEIN: 7.2 (ref 6.1–8)
AMB EXT TOTAL PROTEIN: 7.2 (ref 6.1–8.1)
AMB EXT TRIGLYCERIDES: 41 MG/DL
AMB EXT TSH: 1.42 MIU/ML
AMB EXT TSH: 1.42 MIU/ML
AMB EXT WBC: 2.2 X10(3)UL
BILIRUB UR QL STRIP.AUTO: NEGATIVE
COLOR UR AUTO: YELLOW
CREAT UR-SCNC: 190.4 MG/DL
GLUCOSE UR STRIP.AUTO-MCNC: NORMAL MG/DL
KETONES UR STRIP.AUTO-MCNC: NEGATIVE MG/DL
LEUKOCYTE ESTERASE UR QL STRIP.AUTO: NEGATIVE
MICROALBUMIN UR-MCNC: 0.8 MG/DL
MICROALBUMIN/CREAT 24H UR-RTO: 4.2 UG/MG (ref ?–30)
NITRITE UR QL STRIP.AUTO: NEGATIVE
PH UR STRIP.AUTO: 6 [PH] (ref 5–8)
PROT UR STRIP.AUTO-MCNC: NEGATIVE MG/DL
RBC UR QL AUTO: NEGATIVE
SP GR UR STRIP.AUTO: 1.03 (ref 1–1.03)
UROBILINOGEN UR STRIP.AUTO-MCNC: NORMAL MG/DL

## 2025-04-07 PROCEDURE — 82570 ASSAY OF URINE CREATININE: CPT | Performed by: INTERNAL MEDICINE

## 2025-04-07 PROCEDURE — 93923 UPR/LXTR ART STDY 3+ LVLS: CPT | Performed by: INTERNAL MEDICINE

## 2025-04-07 PROCEDURE — 92551 PURE TONE HEARING TEST AIR: CPT | Performed by: INTERNAL MEDICINE

## 2025-04-07 PROCEDURE — 81001 URINALYSIS AUTO W/SCOPE: CPT | Performed by: INTERNAL MEDICINE

## 2025-04-07 PROCEDURE — 99173 VISUAL ACUITY SCREEN: CPT | Performed by: INTERNAL MEDICINE

## 2025-04-07 PROCEDURE — 82043 UR ALBUMIN QUANTITATIVE: CPT | Performed by: INTERNAL MEDICINE
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
## 2025-04-07 NOTE — TELEPHONE ENCOUNTER
Infusion order, face sheet, 03/29/2025 Ferrtin level and 04/03/2025 OV note faxed to [facility] ([phone])  Confirmation received 1615  
Per Dr. Mccartney:  Please set her up for InFed x 1.  Diagnosis = iron deficiency anemia.  Thank you.     Dr. SAUCEDO       Called [facility] ([phone]), spoke with Jaimie. Per Jaimie, no PA needed  Reference #: [id number]    Called Patient, advised no PA needed and order will be faxed to [facility]; they should contact patient by the end of the week to schedule the infusion.  Patient verbalized understanding    
37.2

## 2025-04-09 ENCOUNTER — TELEPHONE (OUTPATIENT)
Dept: FAMILY MEDICINE CLINIC | Facility: CLINIC | Age: 35
End: 2025-04-09

## 2025-04-10 ENCOUNTER — DOCUMENTATION ONLY (OUTPATIENT)
Facility: CLINIC | Age: 35
End: 2025-04-10

## 2025-04-10 ENCOUNTER — TELEPHONE (OUTPATIENT)
Age: 35
End: 2025-04-10

## 2025-04-11 ENCOUNTER — OFFICE VISIT (OUTPATIENT)
Age: 35
End: 2025-04-11
Attending: INTERNAL MEDICINE
Payer: COMMERCIAL

## 2025-04-11 VITALS
SYSTOLIC BLOOD PRESSURE: 109 MMHG | DIASTOLIC BLOOD PRESSURE: 76 MMHG | OXYGEN SATURATION: 100 % | HEART RATE: 70 BPM | TEMPERATURE: 98 F | RESPIRATION RATE: 18 BRPM

## 2025-04-11 DIAGNOSIS — D50.9 IRON DEFICIENCY ANEMIA: Primary | ICD-10-CM

## 2025-04-14 ENCOUNTER — TELEPHONE (OUTPATIENT)
Facility: CLINIC | Age: 35
End: 2025-04-14

## 2025-04-14 NOTE — TELEPHONE ENCOUNTER
Patient has been having liquid diarrhea.  She has been having gastro issues after going off of Prilosec, having iron infusions and stopping iron supplements.   She does have an appointment with the gastro doc tomorrow.  Please call to discuss.

## 2025-04-14 NOTE — TELEPHONE ENCOUNTER
Agreed.       Zachery Mccartney MD  [facility] Physician  Diplomate, American Board of Internal Medicine  Member, American College of Lifestyle Medicine  Member, American Association for Physician Leadership  [address], [place] [address]  [phone] (phone); [phone] (fax)  [email or web address]

## 2025-04-14 NOTE — TELEPHONE ENCOUNTER
Patient has an appt tomorrow to see Dr. Preston with [facility]:  Future Appointments   Date Time Provider Department Center   4/15/2025  9:40 AM Renzo Preston MD [facility]   4/17/2025  2:30 PM WDR US RM1 WDR US EDW [place]   4/22/2025 11:15 AM Zachery Mccartney MD EMG 24 [facility]G [place]     Saw PCP 04/03/2025 with GI issues:  +loose stools with occasional diarrhea  +hx upper abd pain with relief from Prilosec  +recently restarted Prilosec d/t upper abd tenderness but stopped after experiencing diarrhea      TC to Patient ([phone])   She has not yet picked up the liquid Prilosec as ordered by Dr. Mccartney at her 04/03/2025 visit as she hasn't been having the acidic feeling, but she reports persistent diarrhea in the mornings  No BMs in the afternoon    Last night, she ate steak, [place] sprouts, and salad, and that night she felt like her intestines were inflamed  Reports two episodes liquid diarrhea this morning - previous episodes of diarrhea were soft stool    Denies black/bloody stool, abd pain    Discussed BRAT/bland diet today, see Gastro tomorrow for additional recommendations    Patient agreed.  She will f/u PRN    FYI

## 2025-04-17 ENCOUNTER — HOSPITAL ENCOUNTER (OUTPATIENT)
Dept: ULTRASOUND IMAGING | Age: 35
Discharge: HOME | End: 2025-04-17
Attending: INTERNAL MEDICINE
Payer: COMMERCIAL

## 2025-04-17 ENCOUNTER — PATIENT MESSAGE (OUTPATIENT)
Facility: CLINIC | Age: 35
End: 2025-04-17

## 2025-04-17 DIAGNOSIS — E06.3 AUTOIMMUNE THYROIDITIS: Primary | ICD-10-CM

## 2025-04-17 DIAGNOSIS — R79.89 ELEVATED TSH: ICD-10-CM

## 2025-04-17 PROCEDURE — 76536 US EXAM OF HEAD AND NECK: CPT | Performed by: INTERNAL MEDICINE

## 2025-04-18 NOTE — PROGRESS NOTES
Orders placed for thyroid antibodies.  Her thyroid ultrasound done earlier today shows heterogenous changes suggestive of autoimmune thyroiditis.  Patient notified via [employer].       Zachery Mccartney MD  [facility] Physician  Diplomate, American Board of Internal Medicine  Member, American College of Lifestyle Medicine  Member, American Association for Physician [address], [facility] [address], [place] [address]  [phone] (phone); [phone] (fax)  [email or web address]

## 2025-04-18 NOTE — TELEPHONE ENCOUNTER
Called Patient, scheduled phone visit for Monday AM:  Future Appointments   Date Time Provider Department Center   4/21/2025 10:15 AM Zachery Mccartney MD EMG 24 [facility]G [place]   4/22/2025 11:15 AM Zachery Mccartney MD [facility] 24 [facility] [place]   4/30/2025  3:00 PM Renzo Preston MD [facility]   4/30/2025  3:15 PM Renzo Preston MD [facility]   5/12/2025  9:30 AM WDR US RM1 WDR US EDW [place]       She will complete Thyroid lab on Tues  
ambulatory

## 2025-04-21 ENCOUNTER — VIRTUAL PHONE E/M (OUTPATIENT)
Facility: CLINIC | Age: 35
End: 2025-04-21
Payer: COMMERCIAL

## 2025-04-21 DIAGNOSIS — E06.3 HYPOTHYROIDISM DUE TO HASHIMOTO'S THYROIDITIS: Primary | ICD-10-CM

## 2025-04-21 DIAGNOSIS — D50.8 OTHER IRON DEFICIENCY ANEMIA: ICD-10-CM

## 2025-04-21 PROBLEM — R79.89 ELEVATED TSH: Status: RESOLVED | Noted: 2025-04-03 | Resolved: 2025-04-21

## 2025-04-21 RX ORDER — LEVOTHYROXINE SODIUM 50 UG/1
50 TABLET ORAL
Qty: 90 TABLET | Refills: 3 | Status: SHIPPED | OUTPATIENT
Start: 2025-04-21

## 2025-04-21 NOTE — PROGRESS NOTES
The 21st Century Cures Act makes medical notes like these available to patients in the interest of transparency. Please be advised this is a medical document. Medical documents are intended to carry relevant information, facts as evident, and the clinical opinion of the practitioner. The medical note is intended as peer to peer communication and may appear blunt or direct. It is written in medical language and may contain abbreviations or verbiage that are unfamiliar.           The following individual(s) verbally consented to be recorded using ambient AI listening technology and understand that they can each withdraw their consent to this listening technology at any point by asking the clinician to turn off or pause the recording:    Patient name: Brittany Greenwood      Virtual Telephone Check-In    Brittany Greenwood verbally consents to a Virtual/Telephone Check-In visit on 04/21/25.  Patient has been referred to the [facility] website at [email or web address] to review the yearly Consent to Treat document.    Patient understands and accepts financial responsibility for any deductible, co-insurance and/or co-pays associated with this service.    Duration of the service: 30 minutes      Summary of topics discussed:   History of Present Illness  Brittany Greenwood is a 34 year old female with suspected autoimmune thyroiditis who presents with symptoms of thyroid dysfunction.    She experiences symptoms suggestive of thyroid dysfunction, including hair thinning, hair loss, loose stools, and abdominal pain. She describes a sense of suboptimal vitality, feeling 'functional, but not there.' These symptoms have been ongoing for an unspecified duration.    A blood test on March 24, 2025, revealed an elevated TSH level of 4.837. A thyroid ultrasound on April 17, 2025, showed diffuse heterogeneity throughout the thyroid gland, suggestive of thyroiditis. She has not yet undergone thyroid antibody testing, which was previously ordered  but not drawn.    She notes significant hair thinning, initially attributed to stress or other factors. Additionally, she experiences extreme brain fog and fatigue before her menstrual cycle, which she previously considered normal. Her weight has remained stable without significant fluctuations.    In her family history, both parents had Alzheimer's disease, diagnosed a year apart, but there may be a thyroid disorder in her mother. Her sister-in-law has thyroid issues and is on medication for it.    She is currently taking psyllium husk capsules, prescribed by Dr. Renzo Preston from gastroenterology, which has helped normalize her bowel movements.    Review of Systems   All other systems reviewed and are negative.    Problem List[1]    Patient has no known allergies.    Medications - Current[2]    Short Social Hx on File[3]    PE:  Alert and oriented x 3, NAD.  She is not short of breath and speaks in full sentences.  Her mood is appropriate.      Results  LABS  TSH: 4.837 (03/24/2025)    RADIOLOGY  Thyroid ultrasound: Diffuse heterogeneity throughout the thyroid gland consistent with thyroiditis (04/17/2025)      Assessment & Plan  Autoimmune thyroiditis (Hashimoto's thyroiditis)  Symptoms and elevated TSH suggest Hashimoto's thyroiditis. Ultrasound shows diffuse heterogeneity. Explained chronic nature and lifelong management. Discussed risks of overtreatment.  - Start levothyroxine 50 micrograms daily in the morning.  - Order thyroid antibody test to confirm autoimmune thyroiditis.  However, this would not  as the radiographic diagnostic findings would determine treatment on top of her symptoms.  - Repeat TSH test in 6-8 weeks to assess treatment efficacy.  - Discuss potential side effects of levothyroxine and signs of overtreatment.    Iron deficiency  Evaluated for iron deficiency, possibly linked to thyroid disorder. Informed about common co-occurrence with thyroid issues.  - Perform iron level  testing as ordered by Dr. Preston.  - Continue taking over-the-counter iron supplements as prescribed by Dr. Preston.    Gastrointestinal evaluation  Scheduled for endoscopy and colonoscopy to assess gastrointestinal symptoms. Psyllium husk has normalized bowel movements.  - Proceed with scheduled endoscopy and colonoscopy.  - Continue psyllium husk as prescribed.    RTC tomorrow for her annual physical.  This was previously scheduled.    She verbally agrees to and understands the plan as outlined above.  She was also afforded the time and opportunity to ask questions, which were then answered to the best of my ability.        Zachery Mccartney MD  [facility] Physician  Diplomate, American Board of Internal Medicine  Member, American College of Lifestyle Medicine  Member, American Association for Physician Leadership  [address], [facility] [address], [place] [address]  [phone] (phone); [phone] (fax)  [email or web address]              [1]   Patient Active Problem List  Diagnosis    Telogen effluvium    Iron deficiency anemia    Elevated TSH    Diarrhea    Epigastric pain    Benign ethnic neutropenia    Prediabetes   [2]   Current Outpatient Medications:     sucralfate 1 GM/10ML Oral Suspension, Take 10 mL (1 g total) by mouth 4 (four) times daily before meals and nightly. 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

## 2025-04-22 ENCOUNTER — LAB ENCOUNTER (OUTPATIENT)
Dept: LAB | Facility: HOSPITAL | Age: 35
End: 2025-04-22
Attending: INTERNAL MEDICINE
Payer: COMMERCIAL

## 2025-04-22 ENCOUNTER — OFFICE VISIT (OUTPATIENT)
Facility: CLINIC | Age: 35
End: 2025-04-22
Payer: COMMERCIAL

## 2025-04-22 VITALS
TEMPERATURE: 99 F | HEART RATE: 90 BPM | OXYGEN SATURATION: 99 % | DIASTOLIC BLOOD PRESSURE: 54 MMHG | HEIGHT: 67 IN | BODY MASS INDEX: 19.8 KG/M2 | RESPIRATION RATE: 16 BRPM | WEIGHT: 126.13 LBS | SYSTOLIC BLOOD PRESSURE: 102 MMHG

## 2025-04-22 DIAGNOSIS — Z00.00 ROUTINE GENERAL MEDICAL EXAMINATION AT A HEALTH CARE FACILITY: Primary | ICD-10-CM

## 2025-04-22 DIAGNOSIS — R10.13 EPIGASTRIC PAIN: ICD-10-CM

## 2025-04-22 DIAGNOSIS — E06.3 HYPOTHYROIDISM DUE TO HASHIMOTO'S THYROIDITIS: ICD-10-CM

## 2025-04-22 DIAGNOSIS — E06.3 AUTOIMMUNE THYROIDITIS: ICD-10-CM

## 2025-04-22 DIAGNOSIS — D50.8 OTHER IRON DEFICIENCY ANEMIA: ICD-10-CM

## 2025-04-22 DIAGNOSIS — Z12.31 SCREENING MAMMOGRAM FOR BREAST CANCER: ICD-10-CM

## 2025-04-22 DIAGNOSIS — D50.9 IRON DEFICIENCY ANEMIA, UNSPECIFIED IRON DEFICIENCY ANEMIA TYPE: ICD-10-CM

## 2025-04-22 PROBLEM — R19.7 DIARRHEA: Status: RESOLVED | Noted: 2025-04-03 | Resolved: 2025-04-22

## 2025-04-22 PROBLEM — R73.03 PREDIABETES: Status: RESOLVED | Noted: 2025-04-03 | Resolved: 2025-04-22

## 2025-04-22 LAB
ATRIAL RATE: 84 BPM
DEPRECATED HBV CORE AB SER IA-ACNC: 329 NG/ML (ref 50–306)
IRON SATN MFR SERPL: 48 % (ref 15–50)
IRON SERPL-MCNC: 126 UG/DL (ref 50–170)
P AXIS: 38 DEGREES
P-R INTERVAL: 140 MS
Q-T INTERVAL: 362 MS
QRS DURATION: 84 MS
QTC CALCULATION (BEZET): 427 MS
R AXIS: 73 DEGREES
T AXIS: 46 DEGREES
THYROGLOB SERPL-MCNC: 334 U/ML (ref ?–60)
THYROPEROXIDASE AB SERPL-ACNC: 1601 U/ML (ref ?–60)
TOTAL IRON BINDING CAPACITY: 261 UG/DL (ref 250–425)
TRANSFERRIN SERPL-MCNC: 176 MG/DL (ref 250–380)
VENTRICULAR RATE: 84 BPM

## 2025-04-22 PROCEDURE — 36415 COLL VENOUS BLD VENIPUNCTURE: CPT

## 2025-04-22 PROCEDURE — 86800 THYROGLOBULIN ANTIBODY: CPT

## 2025-04-22 PROCEDURE — 83540 ASSAY OF IRON: CPT

## 2025-04-22 PROCEDURE — 86376 MICROSOMAL ANTIBODY EACH: CPT

## 2025-04-22 PROCEDURE — 83550 IRON BINDING TEST: CPT

## 2025-04-22 PROCEDURE — 82728 ASSAY OF FERRITIN: CPT

## 2025-04-22 NOTE — PROGRESS NOTES
The 21st Century Cures Act makes medical notes like these available to patients in the interest of transparency. Please be advised this is a medical document. Medical documents are intended to carry relevant information, facts as evident, and the clinical opinion of the practitioner. The medical note is intended as peer to peer communication and may appear blunt or direct. It is written in medical language and may contain abbreviations or verbiage that are unfamiliar.           The following individual(s) verbally consented to be recorded using ambient AI listening technology and understand that they can each withdraw their consent to this listening technology at any point by asking the clinician to turn off or pause the recording:    Patient name: Brittany Greenwood      Chief Complaint   Patient presents with    Well Adult        History of Present Illness  Brittany Greenwood is a 34 year old female who presents for an annual physical exam.    She is focused on maintaining a healthy lifestyle and aligning her health goals with her life goals. She is proactive about her wellness and is here to ensure she is on the right track.    Her nutrition includes a variety of fruits and vegetables such as avocados, oranges, kiwis, bananas, strawberries, apples, carrots, and sweet potatoes. She consumes green bell peppers, onions, kale, bren greens, and Jackhorn sprouts less frequently, while avoiding squash, cabbage, cauliflower, and melons like cantaloupe and papaya. She includes high antioxidant foods like blueberries and blackberries. For protein, she prefers meat-based options such as eggs, egg whites, chicken sausage, steak, tuna, salmon, and chicken, over plant-based proteins like beans and lentils. She acknowledges opportunities to increase her intake of legumes and greens.    She estimates consuming at least 64 ounces of water daily, acknowledging she may need more due to her active lifestyle. She sometimes uses Promix,  a Himalayan salt-based electrolyte supplement, especially after intense workouts.    Her fitness routine includes five to six workouts per week, with four to five sessions of strength training and four sessions of cardio, including running and cycling. She has transitioned from using the Stairmaster to cycling for longevity benefits.    She aims for seven to eight hours of sleep per night, going to bed by 9:00 PM. She does not take naps and feels rested after her sleep. No snoring and feels restored after a full night's sleep.    She is aware of her stress levels and acknowledges difficulty in managing stress, especially when multitasking. She used to practice yoga weekly but has focused more on strength training and cardio recently. She tries to stretch regularly, especially before bed.    She feels she has a strong community and support system, including family and close friends who have her back. She values these relationships and understands their importance for her overall well-being.    Review of Systems   All other systems reviewed and are negative.    Problem List[1]    Past Surgical History[2]    Patient has no known allergies.    Medications - Current[3]    Short Social Hx on File[4]    Family History[5]    Immunization History   Administered Date(s) Administered    Covid-19 Vaccine Pfizer 30 mcg/0.3 ml 05/14/2021, 06/04/2021    FLU VAC QIV SPLIT 3 YRS AND OLDER (21850) 10/07/2022    TDAP 06/22/2021       Physical Exam  /54 (BP Location: Left arm, Patient Position: Sitting, Cuff Size: adult)   Pulse 90   Temp 99 °F (37.2 °C) (Temporal)   Resp 16   Ht 5' 7\" (1.702 m)   Wt 126 lb 1.6 oz (57.2 kg)   LMP 04/02/2025 (Approximate)   SpO2 99%   BMI 19.75 kg/m²    GEN: Alert and oriented x 3, no acute distress.  HEENT: Left external auditory canal normal. Left tympanic membrane normal. Right external auditory canal with mild cerumen. Right tympanic membrane unremarkable. Oral pharynx normal.  NECK:  Carotid arteries 2+ bilaterally. No lymphadenopathy. No thyromegaly.  CHEST: Lungs clear to auscultation bilaterally.  CARDIOVASCULAR: Heart regular rate and rhythm, normal S1, S2. No audible murmurs.  ABDOMEN: Abdomen nondistended. Normal active bowel sounds. No tenderness.  EXTREMITIES: Distal pulses in lower extremities full. No peripheral edema. Distal pulses at bilateral wrist 2+.  MUSCULOSKELETAL: Right knee mobility normal, no crepitus. Left knee mobility normal, no crepitus.      Results  LABS  TSH: 1.42 (2025)  Total cholesterol: 196 (2025)  HDL: 70 (2025)  Triglycerides: 41 (2025)  LDL: 114 (2025)  Apolipoprotein B: 97 (2025)  Lipoprotein A: 72 (2025)  HbA1c: 5.5% (2025)  Myeloperoxidase: 295 (2025)  LP-PLA2 activity: 92 (2025)  hs-CRP: <0.2 (2025)  ADMA: 75 (2025)  SDMA: 57 (2025)  Oxidized LDL: 27 (2025)  Vitamin B12: 951 (2025)  Vitamin D: 48.4 (2025)  Coenzyme Q10: 0.67 (2025)  TMAO: 3.0 (2025)  Estimated average glucose: 110 (2025)  Glucose, fastin (2025)  Total calcium: 9.1 (2025)  Na: 130 (2025)  K: 4.5 (2025)  BUN: 14 (2025)  Cr: 0.62 (2025)  Total protein: 7.2 (2025)  Albumin: 4.6 (2025)  Globulin: 2.5 (2025)  Alkaline phosphatase: 49 (2025)  AST: 21 (2025)  ALT: 16 (2025)  Total bilirubin: 0.9 (2025)  eGFR: 118 (2025)  Fib-4 index: 0.79 (2025)  WBC: 2.2 (2025)  RBC: 4.3 (2025)  Hb: 12.4 (2025)  Hct: 38.8 (2025)  PLT: 239 (2025)  Thyroid peroxidase antibodies: 1601 (2025)  Antithyroglobulin: 334 (2025)  Ferritin: 329 (2025)  Iron: 126 (2025)  TIBC: 261 (2025)  Transferrin: 176 (2025)  Percent saturation: 48 (2025)    RADIOLOGY  Thyroid ultrasound: Diffuse heterogeneity throughout the thyroid, consistent with autoimmune  thyroiditis (04/17/2025)    DIAGNOSTIC  EKG: Normal sinus rhythm, rate 84 bpm, no strain, normal axis (04/22/2025)  Ankle brachial index, right: 1.03 (04/07/2025)  Ankle brachial index, left: 1.05 (04/07/2025)   strength, left: 83.2 lbs (04/07/2025)   strength, right: 86.2 lbs (04/07/2025)  Audiometry: Full at 25 dB and 40 dB across all frequencies (04/07/2025)  Visual acuity: Right eye 20/20, left eye 20/20, both eyes 20/20 (04/07/2025)  Body composition: Weight 126.1 lbs, body fat 17.3%, fat-free mass 82.6%, total body water 55.8%, muscle mass 33.3 lbs, BMI 19.4, BMR 1674 kcal/day, balance score 8/10 (04/07/2025)      Assessment & Plan  Wellness Visit  Comprehensive wellness evaluation covering nutrition, fitness, sleep, and social connections. Recommendations made to enhance nutrition and hydration.  - Increase intake of legumes and greens.  - Aim for 64-80 ounces of water daily.  - Continue current fitness routine.  - Maintain sleep hygiene with 7-8 hours per night.  - Order mammogram for preventive care.    Autoimmune thyroiditis (Hashimoto's thyroiditis)  Diagnosis confirmed with elevated thyroid peroxidase antibodies and antithyroglobulin, and ultrasound showing diffuse thyroid heterogeneity. Hair thinning possibly linked to thyroid dysfunction and iron deficiency.  - Continue levothyroxine 50 mcg daily.  - Monitor thyroid function tests regularly.  - Manage hair thinning by addressing thyroid and iron levels.    Epigastric Pain  She is more or less resolved at this time.  However, she has planned EGD and colonoscopy with Dr. Preston on 4/30/2025.  - Proceed with scopes as planned.    Iron Deficiency  She is doing well with repletion therapy as outlined by Dr. Preston. Iron levels are now sufficient.  - Plan on period surveillance lab testing.    Next Steps & Follow Up  - Screening mammogram ordered.  - We discussed the concept of Polygenic Risk Score Testing and multi cancer early detection  testing.      RTC in several months to assess how she is doing with her thyroid disorder.  We can do this virtually.  Ideally, we would also have labs done prior to appointment.    She verbally agrees to and understands the plan as outlined above.  She was also afforded the time and opportunity to ask questions, which were then answered to the best of my ability.        Zachery Mccartney MD  [facility] Physician  Diplomate, American Board of Internal Medicine  Member, American College of Lifestyle Medicine  Member, American Association for Physician Leadership  [address], [address], [place] [id number] [phone] (phone); [phone] (fax)  [email or web address]              [1]   Patient Active Problem List  Diagnosis    Telogen effluvium    Iron deficiency anemia    Epigastric pain    Benign ethnic neutropenia    Hypothyroidism due to Hashimoto's thyroiditis   [2] History reviewed. No pertinent surgical history.  [3]   Current Outpatient Medications:     levothyroxine 50 MCG Oral Tab, Take 1 tablet (50 mcg total) by mouth before breakfast. Take for the treatment of autoimmune thyroiditis., Disp: 90 tablet, Rfl: 3    sucralfate 1 GM/10ML Oral Suspension, Take 10 mL (1 g total) by mouth 4 (four) times daily before meals and nightly. 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    Seat Belt Yes    Self-Exams Yes      Comment: self breast exam 2 times a week     Social Drivers of Health     Food Insecurity: No Food Insecurity (4/22/2025)    NCSS - Food Insecurity     Worried About Running Out of Food in the Last Year: No     Ran Out of Food in the Last Year: No   Transportation Needs: No Transportation Needs (4/22/2025)    NCSS - Transportation     Lack of Transportation: No   Housing Stability: Not At Risk (4/22/2025)    NCSS - Housing/Utilities     Has Housing: Yes     Worried About Losing Housing: No     Unable to Get Utilities: No   [5]   Family History  Problem Relation Age of Onset    Other (Other) Father     Dementia Father     Other (Other) Mother     Mental Disorder Mother     Dementia Mother

## 2025-04-30 PROBLEM — D50.9 IRON DEFICIENCY ANEMIA, UNSPECIFIED: Status: ACTIVE | Noted: 2025-04-30

## 2025-04-30 PROBLEM — R19.4 CHANGE IN BOWEL HABITS: Status: ACTIVE | Noted: 2025-04-30

## 2025-04-30 PROBLEM — R19.7 DIARRHEA, UNSPECIFIED: Status: ACTIVE | Noted: 2025-04-30

## 2025-04-30 PROBLEM — R10.11 ABDOMINAL PAIN, RIGHT UPPER QUADRANT: Status: ACTIVE | Noted: 2025-04-30

## 2025-04-30 PROBLEM — R10.9 PAIN, ABDOMINAL: Status: ACTIVE | Noted: 2025-04-30

## 2025-06-04 ENCOUNTER — DOCUMENTATION ONLY (OUTPATIENT)
Facility: CLINIC | Age: 35
End: 2025-06-04

## 2025-06-11 ENCOUNTER — PATIENT MESSAGE (OUTPATIENT)
Facility: CLINIC | Age: 35
End: 2025-06-11

## 2025-06-11 DIAGNOSIS — E06.3 HYPOTHYROIDISM DUE TO HASHIMOTO'S THYROIDITIS: ICD-10-CM

## 2025-06-11 DIAGNOSIS — D50.8 OTHER IRON DEFICIENCY ANEMIA: Primary | ICD-10-CM

## 2025-06-13 NOTE — TELEPHONE ENCOUNTER
Labs ordered for any time after 7/1/25. Patient notified via [employer].       Zachery Mccartney MD  [facility] Physician  Diplomate, American Board of Internal Medicine  Member, American College of Lifestyle Medicine  Member, American Association for Physician [address], [address] [address]  [phone] (phone); [phone] (fax)  [email or web address]

## 2025-07-02 ENCOUNTER — LAB ENCOUNTER (OUTPATIENT)
Dept: LAB | Facility: HOSPITAL | Age: 35
End: 2025-07-02
Attending: INTERNAL MEDICINE
Payer: COMMERCIAL

## 2025-07-02 DIAGNOSIS — E06.3 HYPOTHYROIDISM DUE TO HASHIMOTO'S THYROIDITIS: ICD-10-CM

## 2025-07-02 DIAGNOSIS — D50.8 OTHER IRON DEFICIENCY ANEMIA: ICD-10-CM

## 2025-07-02 LAB
DEPRECATED HBV CORE AB SER IA-ACNC: 172 NG/ML (ref 50–306)
IRON SATN MFR SERPL: 32 % (ref 15–50)
IRON SERPL-MCNC: 74 UG/DL (ref 50–170)
T4 FREE SERPL-MCNC: 1.3 NG/DL (ref 0.8–1.7)
TOTAL IRON BINDING CAPACITY: 229 UG/DL (ref 250–425)
TRANSFERRIN SERPL-MCNC: 170 MG/DL (ref 250–380)
TSI SER-ACNC: 3.78 UIU/ML (ref 0.55–4.78)

## 2025-07-02 PROCEDURE — 36415 COLL VENOUS BLD VENIPUNCTURE: CPT

## 2025-07-02 PROCEDURE — 84443 ASSAY THYROID STIM HORMONE: CPT

## 2025-07-02 PROCEDURE — 82728 ASSAY OF FERRITIN: CPT

## 2025-07-02 PROCEDURE — 83540 ASSAY OF IRON: CPT

## 2025-07-02 PROCEDURE — 84439 ASSAY OF FREE THYROXINE: CPT

## 2025-07-02 PROCEDURE — 83550 IRON BINDING TEST: CPT

## 2025-07-06 DIAGNOSIS — E06.3 HYPOTHYROIDISM DUE TO HASHIMOTO'S THYROIDITIS: ICD-10-CM

## 2025-07-06 RX ORDER — LEVOTHYROXINE SODIUM 50 UG/1
50 TABLET ORAL
Qty: 90 TABLET | Refills: 3 | Status: CANCELLED | OUTPATIENT
Start: 2025-07-06

## 2025-07-07 NOTE — TELEPHONE ENCOUNTER
Passed protocol     Requested Prescriptions     Pending Prescriptions Disp Refills    levothyroxine 50 MCG Oral Tab 90 tablet 3     Sig: Take 1 tablet (50 mcg total) by mouth before breakfast. Take for the treatment of autoimmune thyroiditis.       Last refill:          levothyroxine 50 MCG Oral Tab 90 tablet 3 4/21/2025 --   Sig:   Take 1 tablet (50 mcg total) by mouth before breakfast. Take for the treatment of autoimmune thyroiditis.         Last office visit: 4/22/2025  \"Autoimmune thyroiditis (Hashimoto's thyroiditis)  Diagnosis confirmed with elevated thyroid peroxidase antibodies and antithyroglobulin, and ultrasound showing diffuse thyroid heterogeneity. Hair thinning possibly linked to thyroid dysfunction and iron deficiency.  - Continue levothyroxine 50 mcg daily.  - Monitor thyroid function tests regularly.  - Manage hair thinning by addressing thyroid and iron levels.\"  Return to clinic: in several months  Future Appointments   Date Time Provider Department Center   8/22/2025  9:00 AM Benny, KATHIA Alejandro [facility]     Last labs: 7/2/2025 TSH

## (undated) NOTE — Clinical Note
Adela, this is a new patient for Dr. Preston.  Diagnosis = epigastric pain and diarrhea, along with iron-deficiency anemia.  Please help in getting her scheduled.  Thank you.  Kind regards, Dr. Mccartney